# Patient Record
Sex: FEMALE | URBAN - METROPOLITAN AREA
[De-identification: names, ages, dates, MRNs, and addresses within clinical notes are randomized per-mention and may not be internally consistent; named-entity substitution may affect disease eponyms.]

---

## 2024-01-19 ENCOUNTER — HOSPITAL ENCOUNTER (OUTPATIENT)
Facility: MEDICAL CENTER | Age: 44
End: 2024-01-19
Attending: PEDIATRICS
Payer: COMMERCIAL

## 2024-01-19 PROCEDURE — 87070 CULTURE OTHR SPECIMN AEROBIC: CPT

## 2024-01-21 LAB
BACTERIA SPEC RESP CULT: NORMAL
SIGNIFICANT IND 70042: NORMAL
SITE SITE: NORMAL
SOURCE SOURCE: NORMAL

## 2024-05-08 ENCOUNTER — OFFICE VISIT (OUTPATIENT)
Dept: MEDICAL GROUP | Facility: MEDICAL CENTER | Age: 44
End: 2024-05-08
Payer: COMMERCIAL

## 2024-05-08 ENCOUNTER — RESEARCH ENCOUNTER (OUTPATIENT)
Dept: RESEARCH | Facility: MEDICAL CENTER | Age: 44
End: 2024-05-08

## 2024-05-08 VITALS
WEIGHT: 169.7 LBS | TEMPERATURE: 97.2 F | HEART RATE: 90 BPM | SYSTOLIC BLOOD PRESSURE: 100 MMHG | OXYGEN SATURATION: 95 % | DIASTOLIC BLOOD PRESSURE: 60 MMHG | HEIGHT: 69 IN | BODY MASS INDEX: 25.13 KG/M2

## 2024-05-08 DIAGNOSIS — Z11.4 SCREENING FOR HIV (HUMAN IMMUNODEFICIENCY VIRUS): ICD-10-CM

## 2024-05-08 DIAGNOSIS — F33.0 MILD EPISODE OF RECURRENT MAJOR DEPRESSIVE DISORDER (HCC): ICD-10-CM

## 2024-05-08 DIAGNOSIS — Z83.438 FAMILY HISTORY OF HYPERLIPIDEMIA: ICD-10-CM

## 2024-05-08 DIAGNOSIS — G43.109 MIGRAINE WITH AURA AND WITHOUT STATUS MIGRAINOSUS, NOT INTRACTABLE: ICD-10-CM

## 2024-05-08 DIAGNOSIS — F90.0 ATTENTION DEFICIT HYPERACTIVITY DISORDER (ADHD), PREDOMINANTLY INATTENTIVE TYPE: ICD-10-CM

## 2024-05-08 DIAGNOSIS — E78.2 MIXED HYPERLIPIDEMIA: Primary | ICD-10-CM

## 2024-05-08 DIAGNOSIS — Z11.59 NEED FOR HEPATITIS C SCREENING TEST: ICD-10-CM

## 2024-05-08 DIAGNOSIS — Z00.00 HEALTHCARE MAINTENANCE: ICD-10-CM

## 2024-05-08 DIAGNOSIS — E55.9 VITAMIN D DEFICIENCY: ICD-10-CM

## 2024-05-08 PROBLEM — Z30.431 CONTRACEPTIVE, SURVEILLANCE, INTRAUTERINE DEVICE: Status: RESOLVED | Noted: 2019-07-15 | Resolved: 2024-05-08

## 2024-05-08 PROCEDURE — 3078F DIAST BP <80 MM HG: CPT | Performed by: STUDENT IN AN ORGANIZED HEALTH CARE EDUCATION/TRAINING PROGRAM

## 2024-05-08 PROCEDURE — 99204 OFFICE O/P NEW MOD 45 MIN: CPT | Performed by: STUDENT IN AN ORGANIZED HEALTH CARE EDUCATION/TRAINING PROGRAM

## 2024-05-08 PROCEDURE — 3074F SYST BP LT 130 MM HG: CPT | Performed by: STUDENT IN AN ORGANIZED HEALTH CARE EDUCATION/TRAINING PROGRAM

## 2024-05-08 RX ORDER — DEXTROAMPHETAMINE SACCHARATE, AMPHETAMINE ASPARTATE, DEXTROAMPHETAMINE SULFATE AND AMPHETAMINE SULFATE 5; 5; 5; 5 MG/1; MG/1; MG/1; MG/1
TABLET ORAL
COMMUNITY
Start: 2024-05-01

## 2024-05-08 RX ORDER — SPIRONOLACTONE 100 MG/1
TABLET, FILM COATED ORAL
COMMUNITY
Start: 2024-05-06

## 2024-05-08 RX ORDER — FLUOXETINE 20 MG/1
TABLET, FILM COATED ORAL
COMMUNITY
Start: 2024-03-26 | End: 2024-05-08

## 2024-05-08 ASSESSMENT — ENCOUNTER SYMPTOMS
SHORTNESS OF BREATH: 0
CONSTIPATION: 0
FOCAL WEAKNESS: 0
SORE THROAT: 0
HEADACHES: 0
SPUTUM PRODUCTION: 0
SPEECH CHANGE: 0
SEIZURES: 0
SENSORY CHANGE: 0
FEVER: 0
COUGH: 0
PALPITATIONS: 0
ABDOMINAL PAIN: 0
MYALGIAS: 0
INSOMNIA: 0
HEARTBURN: 0
WHEEZING: 0

## 2024-05-08 ASSESSMENT — PATIENT HEALTH QUESTIONNAIRE - PHQ9
1. LITTLE INTEREST OR PLEASURE IN DOING THINGS: NOT AT ALL
9. THOUGHTS THAT YOU WOULD BE BETTER OFF DEAD, OR OF HURTING YOURSELF: NOT AT ALL
4. FEELING TIRED OR HAVING LITTLE ENERGY: NOT AT ALL
8. MOVING OR SPEAKING SO SLOWLY THAT OTHER PEOPLE COULD HAVE NOTICED. OR THE OPPOSITE, BEING SO FIGETY OR RESTLESS THAT YOU HAVE BEEN MOVING AROUND A LOT MORE THAN USUAL: NOT AT ALL
SUM OF ALL RESPONSES TO PHQ QUESTIONS 1-9: 0
2. FEELING DOWN, DEPRESSED, IRRITABLE, OR HOPELESS: NOT AT ALL
SUM OF ALL RESPONSES TO PHQ9 QUESTIONS 1 AND 2: 0
3. TROUBLE FALLING OR STAYING ASLEEP OR SLEEPING TOO MUCH: NOT AT ALL
5. POOR APPETITE OR OVEREATING: NOT AT ALL
6. FEELING BAD ABOUT YOURSELF - OR THAT YOU ARE A FAILURE OR HAVE LET YOURSELF OR YOUR FAMILY DOWN: NOT AL ALL
7. TROUBLE CONCENTRATING ON THINGS, SUCH AS READING THE NEWSPAPER OR WATCHING TELEVISION: NOT AT ALL

## 2024-05-08 NOTE — RESEARCH NOTE
Patient has been referred by Corazon Prasad M.D. Sent initial referral follow-up message with instructions to locate and sign consent form(s). The following consent form(s) have been pushed to the patient's MyChart: BOY

## 2024-05-08 NOTE — PROGRESS NOTES
Subjective:     CC:  Diagnoses of Migraine with aura and without status migrainosus, not intractable, Mixed hyperlipidemia, Healthcare maintenance, Vitamin D deficiency, Family history of hyperlipidemia, Attention deficit hyperactivity disorder (ADHD), predominantly inattentive type, and Mild episode of recurrent major depressive disorder (HCC) were pertinent to this visit.    HISTORY OF THE PRESENT ILLNESS: Patient is a 43 y.o. female. This pleasant patient is here today to establish care.. His/her prior PCP was Jennifer Ugarte.        Problem   Attention Deficit Hyperactivity Disorder (Adhd), Predominantly Inattentive Type    Chronic, stable   Following up with behavior health  Truman Alvarez Jr., FNP.     Current Mild Episode of Major Depressive Disorder (Hcc)    Chronic, stable \  History of severe post partum depression as well  Moods better, no Si and HI   Tried weaning off but not tolerated. Doing better with medication  Prozac 20 mg daily   Following up with behavior health        Mixed Hyperlipidemia    Chronic, stable   Lab Results   Component Value Date/Time    CHOLSTRLTOT 243 (H) 05/24/2019 0911    TRIGLYCERIDE 177 (H) 05/24/2019 0911    HDL 48 05/24/2019 0911     (H) 05/24/2019 0911      Migraine With Aura    Chronic, stable   Excessive migraine episodes with OCPS so stopped   Didn't tolerate IUD as well   Since she stopped OCPs , no more migraine episode            Assessment & Plan:   43 y.o. female with the following -    1. Migraine with aura and without status migrainosus, not intractable  Chronic , stable   Continue monitoring   Avoid triggers   Recommendations:  - get 7-9 hours of sleep per night  - drink plenty of fluids  - avoid excessive caffeine intake   - eat regular meals   - get moderate exercise       2. Mixed hyperlipidemia  Chronic, stable   The ASCVD Risk score : <1%  Plan  Recommended to continue low fat healthy diet and regular exercise     - Lipoprotein (a); Future    3.  " Family history of hyperlipidemia  Patient does have family history of hyperlipidemia with elevated LDL and total cholesterol.  But low ASCVD risk score  Will check lipoprotein a levels as well  - Lipoprotein (a); Future  Recommended genetic testing/healthy [place] research project    4. Attention deficit hyperactivity disorder (ADHD), predominantly inattentive type  Chronic, stable  Continue current medications Adderall 20 mg as prescribed by behavioral health  Treatment deferred to specialist group    5. Mild episode of recurrent major depressive disorder (HCC)  This is chronic, stable  Continue Prozac 20 mg daily  Continue follow-up with behavioral health    Health Maintenance: Completed    ROS:   Review of Systems   Constitutional:  Negative for fever and malaise/fatigue.   HENT:  Negative for congestion, ear discharge, ear pain and sore throat.    Respiratory:  Negative for cough, sputum production, shortness of breath and wheezing.    Cardiovascular:  Negative for chest pain, palpitations and leg swelling.   Gastrointestinal:  Negative for abdominal pain, constipation and heartburn.   Genitourinary:  Negative for dysuria and urgency.   Musculoskeletal:  Negative for myalgias.   Neurological:  Negative for sensory change, speech change, focal weakness, seizures and headaches.   Psychiatric/Behavioral:  The patient does not have insomnia.          Objective:       Exam: /60 (BP Location: Left arm, Patient Position: Sitting, BP Cuff Size: Adult)   Pulse 90   Temp 36.2 °C (97.2 °F) (Temporal)   Ht 1.753 m (5' 9\")   Wt 77 kg (169 lb 11.2 oz)   SpO2 95%  Body mass index is 25.06 kg/m².    Physical Exam  Constitutional:       Appearance: Normal appearance.   HENT:      Head: Normocephalic.   Eyes:      General: No scleral icterus.  Cardiovascular:      Rate and Rhythm: Normal rate and regular rhythm.      Pulses: Normal pulses.      Heart sounds: Normal heart sounds.   Pulmonary:      Effort: Pulmonary " effort is normal.      Breath sounds: Normal breath sounds.   Musculoskeletal:      Right lower leg: No edema.      Left lower leg: No edema.   Skin:     General: Skin is warm.   Neurological:      Mental Status: She is alert and oriented to person, place, and time.   Psychiatric:         Mood and Affect: Mood normal.         Behavior: Behavior normal.       Labs: reviewed       Referral for genetic research was offered -accepted    Please note that this dictation was created using voice recognition software. I have made every reasonable attempt to correct obvious errors, but I expect that there are errors of grammar and possibly content that I did not discover before finalizing the note.        No follow-ups on file.    Please note that this dictation was created using voice recognition software. I have made every reasonable attempt to correct obvious errors, but I expect that there are errors of grammar and possibly content that I did not discover before finalizing the note.

## 2024-08-30 ENCOUNTER — HOSPITAL ENCOUNTER (OUTPATIENT)
Dept: LAB | Facility: MEDICAL CENTER | Age: 44
End: 2024-08-30
Attending: STUDENT IN AN ORGANIZED HEALTH CARE EDUCATION/TRAINING PROGRAM
Payer: COMMERCIAL

## 2024-08-30 DIAGNOSIS — Z00.00 HEALTHCARE MAINTENANCE: ICD-10-CM

## 2024-08-30 DIAGNOSIS — Z11.59 NEED FOR HEPATITIS C SCREENING TEST: ICD-10-CM

## 2024-08-30 DIAGNOSIS — Z11.4 SCREENING FOR HIV (HUMAN IMMUNODEFICIENCY VIRUS): ICD-10-CM

## 2024-08-30 DIAGNOSIS — E55.9 VITAMIN D DEFICIENCY: ICD-10-CM

## 2024-08-30 DIAGNOSIS — Z83.438 FAMILY HISTORY OF HYPERLIPIDEMIA: ICD-10-CM

## 2024-08-30 DIAGNOSIS — E78.2 MIXED HYPERLIPIDEMIA: ICD-10-CM

## 2024-08-30 LAB
25(OH)D3 SERPL-MCNC: 25 NG/ML (ref 30–100)
ALBUMIN SERPL BCP-MCNC: 4.6 G/DL (ref 3.2–4.9)
ALBUMIN/GLOB SERPL: 1.6 G/DL
ALP SERPL-CCNC: 53 U/L (ref 30–99)
ALT SERPL-CCNC: 17 U/L (ref 2–50)
ANION GAP SERPL CALC-SCNC: 11 MMOL/L (ref 7–16)
AST SERPL-CCNC: 19 U/L (ref 12–45)
BILIRUB SERPL-MCNC: 0.3 MG/DL (ref 0.1–1.5)
BUN SERPL-MCNC: 14 MG/DL (ref 8–22)
CALCIUM ALBUM COR SERPL-MCNC: 9.2 MG/DL (ref 8.5–10.5)
CALCIUM SERPL-MCNC: 9.7 MG/DL (ref 8.5–10.5)
CHLORIDE SERPL-SCNC: 105 MMOL/L (ref 96–112)
CHOLEST SERPL-MCNC: 243 MG/DL (ref 100–199)
CO2 SERPL-SCNC: 22 MMOL/L (ref 20–33)
CREAT SERPL-MCNC: 0.76 MG/DL (ref 0.5–1.4)
ERYTHROCYTE [DISTWIDTH] IN BLOOD BY AUTOMATED COUNT: 44.5 FL (ref 35.9–50)
EST. AVERAGE GLUCOSE BLD GHB EST-MCNC: 103 MG/DL
GFR SERPLBLD CREATININE-BSD FMLA CKD-EPI: 98 ML/MIN/1.73 M 2
GLOBULIN SER CALC-MCNC: 2.8 G/DL (ref 1.9–3.5)
GLUCOSE SERPL-MCNC: 91 MG/DL (ref 65–99)
HBA1C MFR BLD: 5.2 % (ref 4–5.6)
HCT VFR BLD AUTO: 40 % (ref 37–47)
HCV AB SER QL: NORMAL
HDLC SERPL-MCNC: 57 MG/DL
HGB BLD-MCNC: 12.5 G/DL (ref 12–16)
HIV 1+2 AB+HIV1 P24 AG SERPL QL IA: NORMAL
LDLC SERPL CALC-MCNC: 165 MG/DL
MCH RBC QN AUTO: 28 PG (ref 27–33)
MCHC RBC AUTO-ENTMCNC: 31.3 G/DL (ref 32.2–35.5)
MCV RBC AUTO: 89.7 FL (ref 81.4–97.8)
PLATELET # BLD AUTO: 244 K/UL (ref 164–446)
PMV BLD AUTO: 11.1 FL (ref 9–12.9)
POTASSIUM SERPL-SCNC: 4 MMOL/L (ref 3.6–5.5)
PROT SERPL-MCNC: 7.4 G/DL (ref 6–8.2)
RBC # BLD AUTO: 4.46 M/UL (ref 4.2–5.4)
SODIUM SERPL-SCNC: 138 MMOL/L (ref 135–145)
TRIGL SERPL-MCNC: 105 MG/DL (ref 0–149)
TSH SERPL DL<=0.005 MIU/L-ACNC: 1.02 UIU/ML (ref 0.38–5.33)
WBC # BLD AUTO: 5.7 K/UL (ref 4.8–10.8)

## 2024-08-30 PROCEDURE — 85027 COMPLETE CBC AUTOMATED: CPT

## 2024-08-30 PROCEDURE — 83036 HEMOGLOBIN GLYCOSYLATED A1C: CPT

## 2024-08-30 PROCEDURE — 83695 ASSAY OF LIPOPROTEIN(A): CPT

## 2024-08-30 PROCEDURE — 87389 HIV-1 AG W/HIV-1&-2 AB AG IA: CPT

## 2024-08-30 PROCEDURE — 82306 VITAMIN D 25 HYDROXY: CPT

## 2024-08-30 PROCEDURE — 80061 LIPID PANEL: CPT

## 2024-08-30 PROCEDURE — 86803 HEPATITIS C AB TEST: CPT

## 2024-08-30 PROCEDURE — 84443 ASSAY THYROID STIM HORMONE: CPT

## 2024-08-30 PROCEDURE — 36415 COLL VENOUS BLD VENIPUNCTURE: CPT

## 2024-08-30 PROCEDURE — 80053 COMPREHEN METABOLIC PANEL: CPT

## 2024-09-02 LAB — LPA SERPL-MCNC: 117 MG/DL

## 2024-09-02 NOTE — PROGRESS NOTES
Subjective:     CC:   Chief Complaint   Patient presents with    Annual Exam       HPI:   Estrellita Baker is a 44 y.o. female who presents for annual exam      Labs reviewed  1.  Low vitamin D levels at 25  2. elevated cholesterol levels    Lab Results   Component Value Date/Time    CHOLSTRLTOT 243 (H) 08/30/2024 1152    TRIGLYCERIDE 105 08/30/2024 1152    HDL 57 08/30/2024 1152     (H) 08/30/2024 1152   Lipoprotein A is elevated 117 .   Family history of hyperlipidemia   The 10-year ASCVD risk score (Akilah SANCEHZ, et al., 2019) is: 0.7%      Last pap: 2019, due this year   Last mammo: 07/2023  Last colonoscopy: Due this year  Last Tdap: 2020       Menses every month with 4-5 days, sometimes heavy   Mild cramps , need tylenol or ibuprofen once a while   No significant bloating/fluid retention, pelvic pain, or dyspareunia. No vaginal discharge   No breast tenderness, mass, nipple discharge, changes in size or contour, or abnormal cyclic discomfort.  She does perform regular self breast exams.  Regular exercise: yes   Diet: healthy diet     She  has a past medical history of Contraceptive, surveillance, intrauterine device (07/15/2019) and Diabetes (HCC).  She  has a past surgical history that includes tonsillectomy and other.    Family History   Problem Relation Age of Onset    Autoimmune Disease Mother     Liver Cancer Mother     Hyperlipidemia Father     Hypertension Father     Melanoma Maternal Grandfather        Social History     Socioeconomic History    Marital status:      Spouse name: Not on file    Number of children: Not on file    Years of education: Not on file    Highest education level: Not on file   Occupational History    Not on file   Tobacco Use    Smoking status: Never    Smokeless tobacco: Never   Substance and Sexual Activity    Alcohol use: Yes     Comment: socially    Drug use: Never    Sexual activity: Yes     Partners: Male   Other Topics Concern    Not on file   Social  "History Narrative    Not on file     Social Determinants of Health     Financial Resource Strain: Not on file   Food Insecurity: Not on file   Transportation Needs: Not on file   Physical Activity: Not on file   Stress: Not on file   Social Connections: Not on file   Intimate Partner Violence: Not on file   Housing Stability: Not on file       Patient Active Problem List    Diagnosis Date Noted    Attention deficit hyperactivity disorder (ADHD), predominantly inattentive type 05/08/2024    Current mild episode of major depressive disorder (HCC) 09/10/2019    Need for vaccination 09/10/2019    Mixed hyperlipidemia 06/07/2019    Chronic fatigue 06/07/2019    RUQ pain 05/22/2019    Migraine with aura 05/22/2019         Current Outpatient Medications   Medication Sig Dispense Refill    amphetamine-dextroamphetamine (ADDERALL) 20 MG Tab       FLUoxetine (PROZAC) 20 MG Cap Take 1 Cap by mouth every day. 30 Cap 3     No current facility-administered medications for this visit.     No Known Allergies    Review of Systems    Review of Systems   Constitutional:  Negative for chills and fever.   HENT:  Negative for congestion and sore throat.    Cardiovascular:  Negative for chest pain and palpitations.   Gastrointestinal:  Negative for abdominal pain, blood in stool, melena and vomiting.   Musculoskeletal:  Negative for falls.   Skin:  Negative for rash.   Neurological:  Negative for dizziness, sensory change, focal weakness and seizures.   Psychiatric/Behavioral:  Negative for hallucinations and suicidal ideas.        Objective:     /60 (BP Location: Left arm, Patient Position: Sitting, BP Cuff Size: Adult)   Pulse 71   Temp 36.3 °C (97.3 °F) (Temporal)   Ht 1.753 m (5' 9\")   Wt 79.6 kg (175 lb 6.4 oz)   LMP 08/15/2024   SpO2 99%   BMI 25.90 kg/m²   Body mass index is 25.9 kg/m².  Wt Readings from Last 4 Encounters:   09/03/24 79.6 kg (175 lb 6.4 oz)   05/08/24 77 kg (169 lb 11.2 oz)   12/29/20 86.2 kg (190 lb) "   09/10/19 86.6 kg (191 lb)       Physical Exam:    Physical Exam  Constitutional:       Appearance: Normal appearance.   HENT:      Head: Normocephalic.      Mouth/Throat:      Mouth: Mucous membranes are moist.      Pharynx: Oropharynx is clear.   Eyes:      General: No scleral icterus.     Conjunctiva/sclera: Conjunctivae normal.   Cardiovascular:      Rate and Rhythm: Normal rate and regular rhythm.      Pulses: Normal pulses.      Heart sounds: Normal heart sounds.   Pulmonary:      Effort: Pulmonary effort is normal.      Breath sounds: Normal breath sounds.   Abdominal:      General: Bowel sounds are normal.      Palpations: Abdomen is soft.   Musculoskeletal:      Cervical back: Neck supple.      Right lower leg: No edema.      Left lower leg: No edema.   Skin:     General: Skin is warm.      Capillary Refill: Capillary refill takes less than 2 seconds.   Neurological:      General: No focal deficit present.      Mental Status: She is alert and oriented to person, place, and time.   Psychiatric:         Mood and Affect: Mood normal.         Behavior: Behavior normal.          Assessment and Plan:     1. Wellness examination        2. Mixed hyperlipidemia  Referral to Vascular Medicine      3. Vitamin D deficiency        4. Colon cancer screening  COLOGUARD (FIT DNA)      5. Elevated lipoprotein A level  Referral to Vascular Medicine      6. Encounter for screening mammogram for malignant neoplasm of breast  MA-SCREENING MAMMO BILAT W/TOMOSYNTHESIS W/CAD        1. Wellness examination    HCM:      regular exercise   healthy diet  Sun protection w SPF  Safety belts  Biannual dentis visit   Substance Abuse: Declined  Safe in relationship. Yes  Seat belts, bike helmet, gun safety discussed.  Colon cancer screening: due   Mammogram: due   Last lab results were reviewed by me today   Labs per orders    2. Mixed hyperlipidemia  3. Elevated lipoprotein A level    Chronic, stable   Lab Results   Component Value  Date/Time    CHOLSTRLTOT 243 (H) 08/30/2024 1152    TRIGLYCERIDE 105 08/30/2024 1152    HDL 57 08/30/2024 1152     (H) 08/30/2024 1152     Elevated lipoprotein A  The 10-year ASCVD risk score (Akilah SANCHEZ, et al., 2019) is: 0.7%  Plan:  Recommended to continue low fat healthy diet and regular exercise   - Referral to Vascular Medicine    3. Vitamin D deficiency  Recommended OTC vit D 2000 IU daily     4. Colon cancer screening  - COLOGUARD (FIT DNA)      5. Encounter for screening mammogram for malignant neoplasm of breast  - MA-SCREENING MAMMO BILAT W/TOMOSYNTHESIS W/CAD; Future        Follow-up: Return in about 4 months (around 1/3/2025) for pap.

## 2024-09-03 ENCOUNTER — OFFICE VISIT (OUTPATIENT)
Dept: MEDICAL GROUP | Facility: MEDICAL CENTER | Age: 44
End: 2024-09-03
Payer: COMMERCIAL

## 2024-09-03 VITALS
HEIGHT: 69 IN | WEIGHT: 175.4 LBS | SYSTOLIC BLOOD PRESSURE: 106 MMHG | HEART RATE: 71 BPM | BODY MASS INDEX: 25.98 KG/M2 | DIASTOLIC BLOOD PRESSURE: 60 MMHG | OXYGEN SATURATION: 99 % | TEMPERATURE: 97.3 F

## 2024-09-03 DIAGNOSIS — Z12.31 ENCOUNTER FOR SCREENING MAMMOGRAM FOR MALIGNANT NEOPLASM OF BREAST: ICD-10-CM

## 2024-09-03 DIAGNOSIS — E78.41 ELEVATED LIPOPROTEIN A LEVEL: ICD-10-CM

## 2024-09-03 DIAGNOSIS — Z12.11 COLON CANCER SCREENING: ICD-10-CM

## 2024-09-03 DIAGNOSIS — E55.9 VITAMIN D DEFICIENCY: ICD-10-CM

## 2024-09-03 DIAGNOSIS — E78.2 MIXED HYPERLIPIDEMIA: ICD-10-CM

## 2024-09-03 DIAGNOSIS — Z00.00 WELLNESS EXAMINATION: Primary | ICD-10-CM

## 2024-09-03 PROCEDURE — 3074F SYST BP LT 130 MM HG: CPT | Performed by: STUDENT IN AN ORGANIZED HEALTH CARE EDUCATION/TRAINING PROGRAM

## 2024-09-03 PROCEDURE — 99396 PREV VISIT EST AGE 40-64: CPT | Performed by: STUDENT IN AN ORGANIZED HEALTH CARE EDUCATION/TRAINING PROGRAM

## 2024-09-03 PROCEDURE — 3078F DIAST BP <80 MM HG: CPT | Performed by: STUDENT IN AN ORGANIZED HEALTH CARE EDUCATION/TRAINING PROGRAM

## 2024-09-03 PROCEDURE — 99213 OFFICE O/P EST LOW 20 MIN: CPT | Mod: 25 | Performed by: STUDENT IN AN ORGANIZED HEALTH CARE EDUCATION/TRAINING PROGRAM

## 2024-09-03 ASSESSMENT — FIBROSIS 4 INDEX: FIB4 SCORE: 0.85

## 2024-09-03 ASSESSMENT — ENCOUNTER SYMPTOMS
SENSORY CHANGE: 0
SORE THROAT: 0
FALLS: 0
FEVER: 0
DIZZINESS: 0
BLOOD IN STOOL: 0
SEIZURES: 0
FOCAL WEAKNESS: 0
ABDOMINAL PAIN: 0
VOMITING: 0
HALLUCINATIONS: 0
PALPITATIONS: 0
CHILLS: 0

## 2024-09-10 ENCOUNTER — TELEPHONE (OUTPATIENT)
Dept: HEALTH INFORMATION MANAGEMENT | Facility: OTHER | Age: 44
End: 2024-09-10
Payer: COMMERCIAL

## 2024-09-19 ENCOUNTER — HOSPITAL ENCOUNTER (OUTPATIENT)
Dept: RADIOLOGY | Facility: MEDICAL CENTER | Age: 44
End: 2024-09-19
Payer: COMMERCIAL

## 2024-09-25 ENCOUNTER — HOSPITAL ENCOUNTER (OUTPATIENT)
Dept: RADIOLOGY | Facility: MEDICAL CENTER | Age: 44
End: 2024-09-25
Attending: STUDENT IN AN ORGANIZED HEALTH CARE EDUCATION/TRAINING PROGRAM
Payer: COMMERCIAL

## 2024-09-25 DIAGNOSIS — Z12.31 ENCOUNTER FOR SCREENING MAMMOGRAM FOR MALIGNANT NEOPLASM OF BREAST: ICD-10-CM

## 2024-09-25 PROCEDURE — 77067 SCR MAMMO BI INCL CAD: CPT

## 2024-09-30 ENCOUNTER — TELEPHONE (OUTPATIENT)
Dept: VASCULAR LAB | Facility: MEDICAL CENTER | Age: 44
End: 2024-09-30
Payer: COMMERCIAL

## 2024-09-30 NOTE — TELEPHONE ENCOUNTER
Called to confirm if this will be first time patient is Vascular Med provider and review if any recent testing completed or hospital admissions. No answer, left voicemail to call back.    Correct appointment type? YES   Referral attached to appointment? YES   New patient packet sent via [employer]?  YES

## 2024-10-02 ENCOUNTER — HOSPITAL ENCOUNTER (OUTPATIENT)
Dept: RADIOLOGY | Facility: MEDICAL CENTER | Age: 44
End: 2024-10-02
Attending: STUDENT IN AN ORGANIZED HEALTH CARE EDUCATION/TRAINING PROGRAM
Payer: COMMERCIAL

## 2024-10-02 DIAGNOSIS — R92.8 ABNORMAL MAMMOGRAM: ICD-10-CM

## 2024-10-02 PROCEDURE — 76642 ULTRASOUND BREAST LIMITED: CPT | Mod: LT

## 2024-10-04 ENCOUNTER — OFFICE VISIT (OUTPATIENT)
Dept: VASCULAR LAB | Facility: MEDICAL CENTER | Age: 44
End: 2024-10-04
Attending: FAMILY MEDICINE
Payer: COMMERCIAL

## 2024-10-04 VITALS
SYSTOLIC BLOOD PRESSURE: 110 MMHG | BODY MASS INDEX: 25.48 KG/M2 | HEIGHT: 69 IN | HEART RATE: 76 BPM | WEIGHT: 172 LBS | DIASTOLIC BLOOD PRESSURE: 80 MMHG

## 2024-10-04 DIAGNOSIS — E78.00 PRIMARY HYPERCHOLESTEROLEMIA: Chronic | ICD-10-CM

## 2024-10-04 DIAGNOSIS — E78.41 ELEVATED LIPOPROTEIN(A): Chronic | ICD-10-CM

## 2024-10-04 DIAGNOSIS — E55.9 VITAMIN D DEFICIENCY: ICD-10-CM

## 2024-10-04 PROBLEM — N92.6 ABNORMAL MENSES: Status: ACTIVE | Noted: 2024-10-04

## 2024-10-04 PROBLEM — Z86.2 HX OF IRON DEFICIENCY ANEMIA: Status: ACTIVE | Noted: 2024-10-04

## 2024-10-04 PROBLEM — F41.9 ANXIETY: Status: ACTIVE | Noted: 2024-10-04

## 2024-10-04 PROCEDURE — 3079F DIAST BP 80-89 MM HG: CPT | Performed by: FAMILY MEDICINE

## 2024-10-04 PROCEDURE — 99212 OFFICE O/P EST SF 10 MIN: CPT

## 2024-10-04 PROCEDURE — 99204 OFFICE O/P NEW MOD 45 MIN: CPT | Performed by: FAMILY MEDICINE

## 2024-10-04 PROCEDURE — 3074F SYST BP LT 130 MM HG: CPT | Performed by: FAMILY MEDICINE

## 2024-10-04 RX ORDER — ERGOCALCIFEROL 1.25 MG/1
CAPSULE, LIQUID FILLED ORAL
COMMUNITY

## 2024-10-04 RX ORDER — ROSUVASTATIN CALCIUM 10 MG/1
10 TABLET, COATED ORAL DAILY
Qty: 100 TABLET | Refills: 3 | Status: SHIPPED | OUTPATIENT
Start: 2024-10-04 | End: 2024-10-14

## 2024-10-04 ASSESSMENT — ENCOUNTER SYMPTOMS
PND: 0
SHORTNESS OF BREATH: 0
PALPITATIONS: 0
CLAUDICATION: 0
CHILLS: 0
SPUTUM PRODUCTION: 0
HEMOPTYSIS: 0
WHEEZING: 0
ORTHOPNEA: 0
FEVER: 0
COUGH: 0

## 2024-10-04 ASSESSMENT — FIBROSIS 4 INDEX: FIB4 SCORE: 0.85

## 2024-10-14 DIAGNOSIS — E78.00 PRIMARY HYPERCHOLESTEROLEMIA: ICD-10-CM

## 2024-10-14 RX ORDER — ATORVASTATIN CALCIUM 10 MG/1
10 TABLET, FILM COATED ORAL NIGHTLY
Qty: 30 TABLET | Refills: 11 | Status: SHIPPED | OUTPATIENT
Start: 2024-10-14

## 2024-11-22 ENCOUNTER — OFFICE VISIT (OUTPATIENT)
Dept: MEDICAL GROUP | Facility: MEDICAL CENTER | Age: 44
End: 2024-11-22
Payer: COMMERCIAL

## 2024-11-22 ENCOUNTER — HOSPITAL ENCOUNTER (OUTPATIENT)
Facility: MEDICAL CENTER | Age: 44
End: 2024-11-22
Attending: STUDENT IN AN ORGANIZED HEALTH CARE EDUCATION/TRAINING PROGRAM
Payer: COMMERCIAL

## 2024-11-22 VITALS
DIASTOLIC BLOOD PRESSURE: 60 MMHG | SYSTOLIC BLOOD PRESSURE: 120 MMHG | WEIGHT: 171.4 LBS | HEART RATE: 64 BPM | OXYGEN SATURATION: 99 % | BODY MASS INDEX: 25.39 KG/M2 | TEMPERATURE: 98 F | HEIGHT: 69 IN

## 2024-11-22 DIAGNOSIS — N89.8 FOUL SMELLING VAGINAL DISCHARGE: ICD-10-CM

## 2024-11-22 DIAGNOSIS — E72.52: ICD-10-CM

## 2024-11-22 DIAGNOSIS — B96.89 BACTERIAL VAGINOSIS: ICD-10-CM

## 2024-11-22 DIAGNOSIS — M54.50 LOW BACK PAIN WITHOUT SCIATICA, UNSPECIFIED BACK PAIN LATERALITY, UNSPECIFIED CHRONICITY: ICD-10-CM

## 2024-11-22 DIAGNOSIS — N76.0 BACTERIAL VAGINOSIS: ICD-10-CM

## 2024-11-22 PROCEDURE — 87510 GARDNER VAG DNA DIR PROBE: CPT

## 2024-11-22 PROCEDURE — 3074F SYST BP LT 130 MM HG: CPT | Performed by: STUDENT IN AN ORGANIZED HEALTH CARE EDUCATION/TRAINING PROGRAM

## 2024-11-22 PROCEDURE — 99214 OFFICE O/P EST MOD 30 MIN: CPT | Performed by: STUDENT IN AN ORGANIZED HEALTH CARE EDUCATION/TRAINING PROGRAM

## 2024-11-22 PROCEDURE — 3078F DIAST BP <80 MM HG: CPT | Performed by: STUDENT IN AN ORGANIZED HEALTH CARE EDUCATION/TRAINING PROGRAM

## 2024-11-22 PROCEDURE — 87660 TRICHOMONAS VAGIN DIR PROBE: CPT

## 2024-11-22 PROCEDURE — 87480 CANDIDA DNA DIR PROBE: CPT

## 2024-11-22 ASSESSMENT — FIBROSIS 4 INDEX: FIB4 SCORE: 0.85

## 2024-11-22 NOTE — PROGRESS NOTES
"Subjective:     CC:   Chief Complaint   Patient presents with    Other     Pain in pelvic area        HPI:   Estrellita presents today with  Verbal consent was acquired by the patient to use [employer] ambient listening note generation during this visit Yes   History of Present Illness  The patient presents for evaluation of vaginal odor.    Vaginal Odor  She was previously prescribed rosuvastatin by her vascular medicine specialist for cholesterol management. However, she experienced an unpleasant vaginal odor as a side effect. After discussing this with her provider, her medication was switched to atorvastatin. Initially, the symptoms subsided, but they have since returned.     - Onset: Symptoms reappeared a week ago after refilling atorvastatin prescription 30 days ago.  - Character: Unpleasant vaginal odor.  - Timing: Initially subsided after switching to atorvastatin, but have since returned.  -Reports persistent foul fishy odor with mild low back discomfort  She is uncertain if these symptoms are related to her cholesterol medication. She experienced back pain yesterday, but it has since resolved.    She reports no symptoms of urinary tract infection.  Denies dysuria, lower abdominal pain, flank tenderness or pain    ALLERGIES  She is allergic to CRESTOR.       Statin?   could be due to the medication potentially exacerbating a condition called \"trimethylaminuria\" (also known as \"fish odor syndrome\") where the body produces excessive amounts of trimethylamine, a compound with a fishy smell that can be excreted through vaginal secretions, sweat, breath, and urine;     Health Maintenance: Completed    ROS:  ROS    Review of systems unremarkable except for concerns noted by patient or items listed.    Please see HPI for additional ROS.      Objective:     Exam:  /60 (BP Location: Left arm, Patient Position: Sitting, BP Cuff Size: Adult)   Pulse 64   Temp 36.7 °C (98 °F) (Temporal)   Ht 1.753 m (5' 9\")   Wt " 77.7 kg (171 lb 6.4 oz)   LMP 11/03/2024   SpO2 99%   BMI 25.31 kg/m²  Body mass index is 25.31 kg/m².    Physical Exam  Abdominal:      Tenderness: There is no right CVA tenderness or left CVA tenderness.             Labs: Reviewed    Assessment & Plan:     44 y.o. female with the following -     1. Foul smelling vaginal discharge  2.  Bacterial vaginosis  This is a new problem.  Will check for vaginal pathogens to rule out BV.  If BV positive will treat with metronidazole 500 mg twice daily for 7 days  - VAGINAL PATHOGENS DNA PANEL; Future    3. Fish odor syndrome (HCC)  Patient reports having fishy odor foul smell and vaginal discharge since she started using statin therapy initially with rosuvastatin and return of symptoms with atorvastatin.    Rosuvastatin can cause for short indrawn but usually atorvastatin less likely.  Will first rule out  vaginal BV infection if negative then recommended to discuss with vascular medicine about other options      Follow-up as needed    Please note that this dictation was created using voice recognition software. I have made every reasonable attempt to correct obvious errors, but I expect that there are errors of grammar and possibly content that I did not discover before finalizing the note.

## 2024-11-23 LAB
CANDIDA DNA VAG QL PROBE+SIG AMP: NEGATIVE
G VAGINALIS DNA VAG QL PROBE+SIG AMP: POSITIVE
T VAGINALIS DNA VAG QL PROBE+SIG AMP: NEGATIVE

## 2024-11-24 RX ORDER — METRONIDAZOLE 500 MG/1
500 TABLET ORAL 2 TIMES DAILY
Qty: 14 TABLET | Refills: 0 | Status: SHIPPED | OUTPATIENT
Start: 2024-11-24 | End: 2024-12-01

## 2024-12-03 ENCOUNTER — HOSPITAL ENCOUNTER (OUTPATIENT)
Dept: LAB | Facility: MEDICAL CENTER | Age: 44
End: 2024-12-03
Attending: FAMILY MEDICINE
Payer: COMMERCIAL

## 2024-12-03 DIAGNOSIS — E78.00 PRIMARY HYPERCHOLESTEROLEMIA: Chronic | ICD-10-CM

## 2024-12-03 PROCEDURE — 36415 COLL VENOUS BLD VENIPUNCTURE: CPT

## 2024-12-03 PROCEDURE — 80053 COMPREHEN METABOLIC PANEL: CPT

## 2024-12-03 PROCEDURE — 82172 ASSAY OF APOLIPOPROTEIN: CPT

## 2024-12-03 PROCEDURE — 80061 LIPID PANEL: CPT

## 2024-12-03 PROCEDURE — 86141 C-REACTIVE PROTEIN HS: CPT

## 2024-12-04 LAB
ALBUMIN SERPL BCP-MCNC: 4.3 G/DL (ref 3.2–4.9)
ALBUMIN/GLOB SERPL: 1.7 G/DL
ALP SERPL-CCNC: 54 U/L (ref 30–99)
ALT SERPL-CCNC: 11 U/L (ref 2–50)
ANION GAP SERPL CALC-SCNC: 14 MMOL/L (ref 7–16)
AST SERPL-CCNC: 19 U/L (ref 12–45)
BILIRUB SERPL-MCNC: 0.3 MG/DL (ref 0.1–1.5)
BUN SERPL-MCNC: 14 MG/DL (ref 8–22)
CALCIUM ALBUM COR SERPL-MCNC: 8.9 MG/DL (ref 8.5–10.5)
CALCIUM SERPL-MCNC: 9.1 MG/DL (ref 8.5–10.5)
CHLORIDE SERPL-SCNC: 105 MMOL/L (ref 96–112)
CHOLEST SERPL-MCNC: 162 MG/DL (ref 100–199)
CO2 SERPL-SCNC: 22 MMOL/L (ref 20–33)
CREAT SERPL-MCNC: 0.71 MG/DL (ref 0.5–1.4)
CRP SERPL HS-MCNC: 0.9 MG/L (ref 0–3)
GFR SERPLBLD CREATININE-BSD FMLA CKD-EPI: 106 ML/MIN/1.73 M 2
GLOBULIN SER CALC-MCNC: 2.5 G/DL (ref 1.9–3.5)
GLUCOSE SERPL-MCNC: 91 MG/DL (ref 65–99)
HDLC SERPL-MCNC: 60 MG/DL
LDLC SERPL CALC-MCNC: 90 MG/DL
POTASSIUM SERPL-SCNC: 4.4 MMOL/L (ref 3.6–5.5)
PROT SERPL-MCNC: 6.8 G/DL (ref 6–8.2)
SODIUM SERPL-SCNC: 141 MMOL/L (ref 135–145)
TRIGL SERPL-MCNC: 62 MG/DL (ref 0–149)

## 2024-12-06 LAB — APO B100 SERPL-MCNC: 69 MG/DL (ref 60–117)

## 2024-12-10 ENCOUNTER — OFFICE VISIT (OUTPATIENT)
Dept: VASCULAR LAB | Facility: MEDICAL CENTER | Age: 44
End: 2024-12-10
Attending: FAMILY MEDICINE
Payer: COMMERCIAL

## 2024-12-10 VITALS
HEIGHT: 69 IN | SYSTOLIC BLOOD PRESSURE: 113 MMHG | DIASTOLIC BLOOD PRESSURE: 80 MMHG | WEIGHT: 175 LBS | BODY MASS INDEX: 25.92 KG/M2 | HEART RATE: 72 BPM

## 2024-12-10 DIAGNOSIS — E78.41 ELEVATED LIPOPROTEIN(A): ICD-10-CM

## 2024-12-10 DIAGNOSIS — E55.9 VITAMIN D DEFICIENCY: ICD-10-CM

## 2024-12-10 DIAGNOSIS — E78.00 PRIMARY HYPERCHOLESTEROLEMIA: ICD-10-CM

## 2024-12-10 PROCEDURE — 3079F DIAST BP 80-89 MM HG: CPT | Performed by: FAMILY MEDICINE

## 2024-12-10 PROCEDURE — 3074F SYST BP LT 130 MM HG: CPT | Performed by: FAMILY MEDICINE

## 2024-12-10 PROCEDURE — 99212 OFFICE O/P EST SF 10 MIN: CPT

## 2024-12-10 PROCEDURE — 99213 OFFICE O/P EST LOW 20 MIN: CPT | Performed by: FAMILY MEDICINE

## 2024-12-10 ASSESSMENT — ENCOUNTER SYMPTOMS
FEVER: 0
SPUTUM PRODUCTION: 0
PND: 0
ORTHOPNEA: 0
PALPITATIONS: 0
COUGH: 0
WHEEZING: 0
CHILLS: 0
CLAUDICATION: 0
HEMOPTYSIS: 0
SHORTNESS OF BREATH: 0

## 2024-12-10 ASSESSMENT — FIBROSIS 4 INDEX: FIB4 SCORE: 1.06

## 2024-12-10 NOTE — PROGRESS NOTES
FOLLOW-UP FAMILY LIPID CLINIC  12/10/24     Estrellita Baker has been referred for evaluation and mgmt of dyslipidemia, Est 10/2024   Referral Source: Corzaon Prasad M.D.       Subjective      Interval hx/concerns: last seen 10/2024, started atorva and tolerating.  Had labs    Had failed rosuva due to vaginitis.     CURRENT MED MGMT  Current Rx's:   Statin: atorva 10mg   Non-Statin: None  Supplements: None  Current adverse drug reactions/side effects? N/a  Adherence? N\A    LIFESTYLE MGMT  Change in weight: Stable  Exercise habits: minimal exercise  Dietary patterns: increased salad, veg - reduced red meat, chicken, turkey.   Etoh: see sochx  Barriers to care/SDOH: none  Tobacco:  reports that she has never smoked. She has never used smokeless tobacco.     PERTINENT HLD PMHX  Initial visit history: seen by PCP 9/3/24 for AWV, had labs showing high , lp(a) 117 mg/dl.  Advised to continue healthy diet and exercise and ref to our clinic for further review. Pt reports no discussion of high lp(a) completed to date.   Age at Initial Diagnosis of Dyslipidemia: 40s    Baseline Lipids Prior to Treatment:    Latest Reference Range & Units 08/30/24 11:52   Cholesterol,Tot 100 - 199 mg/dL 243 (H)   Triglycerides 0 - 149 mg/dL 105   HDL >=40 mg/dL 57   LDL <100 mg/dL 165 (H)   Lipoprotein (a) <=29 mg/dL 117 (H)     History of ASCVD: None  Other Established (non-atherosclerotic) Vascular Disease: None  Secondary contributors/causes of dyslipidemia:  NONE   Previously Attempted Interventions for Lipids - including outcome  Statin: rosuvastatin   Outcome: vaginitis  Non-Statin: none   Outcome: not applicable    OTHER CV RISK FACTORS:    HTN: no prior dx or meds    Dysglycemia: no  Antithrombotic tx: no     Current Outpatient Medications on File Prior to Visit   Medication Sig Dispense Refill    atorvastatin (LIPITOR) 10 MG Tab Take 1 Tablet by mouth every evening. 30 Tablet 11    vitamin D2, Ergocalciferol, (DRISDOL)  "1.25 MG (82695 UT) Cap capsule Take  by mouth every 7 days.      multivitamin Tab Take 1 Tablet by mouth every day.      amphetamine-dextroamphetamine (ADDERALL) 20 MG Tab       FLUoxetine (PROZAC) 20 MG Cap Take 1 Cap by mouth every day. 30 Cap 3     No current facility-administered medications on file prior to visit.      Allergies   Allergen Reactions    Rosuvastatin Unspecified     Vaginal discomfort , fish odor       Social History     Tobacco Use    Smoking status: Never    Smokeless tobacco: Never   Substance Use Topics    Alcohol use: Yes     Comment: socially    Drug use: Never     Review of Systems   Constitutional:  Negative for chills, fever and malaise/fatigue.   Respiratory:  Negative for cough, hemoptysis, sputum production, shortness of breath and wheezing.    Cardiovascular:  Negative for chest pain, palpitations, orthopnea, claudication, leg swelling and PND.         Objective    Vitals:    12/10/24 1119 12/10/24 1122   BP: 120/81 113/80   BP Location: Left arm Left arm   Patient Position: Sitting Sitting   BP Cuff Size: Large adult Large adult   Pulse: 72 72   Weight: 79.4 kg (175 lb)    Height: 1.753 m (5' 9\")      BP Readings from Last 5 Encounters:   12/10/24 113/80   11/22/24 120/60   10/04/24 110/80   09/03/24 106/60   05/08/24 100/60     BMI Readings from Last 1 Encounters:   12/10/24 25.84 kg/m²     Wt Readings from Last 3 Encounters:   12/10/24 79.4 kg (175 lb)   11/22/24 77.7 kg (171 lb 6.4 oz)   10/04/24 78 kg (172 lb)     Physical Exam  Constitutional:       General: She is not in acute distress.     Appearance: Normal appearance. She is not ill-appearing.   HENT:      Head: Normocephalic and atraumatic.   Eyes:      Conjunctiva/sclera: Conjunctivae normal.      Comments: No corneal arcus   Neck:      Vascular: No carotid bruit.   Cardiovascular:      Rate and Rhythm: Normal rate and regular rhythm.      Pulses:           Carotid pulses are 2+ on the right side and 2+ on the left " side.       Radial pulses are 2+ on the right side and 2+ on the left side.        Popliteal pulses are 2+ on the right side and 2+ on the left side.        Dorsalis pedis pulses are 2+ on the right side and 2+ on the left side.      Heart sounds: No murmur heard.  Pulmonary:      Effort: Pulmonary effort is normal.      Breath sounds: Normal breath sounds.   Musculoskeletal:      Cervical back: Normal range of motion.      Right lower leg: No edema.      Left lower leg: No edema.   Feet:      Comments: No achilles thickening or nodularity  Skin:     General: Skin is warm and dry.      Coloration: Skin is not cyanotic or mottled.      Findings: No wound.      Comments: No tendon xanthomas   Neurological:      General: No focal deficit present.      Mental Status: She is alert.   Psychiatric:         Mood and Affect: Mood normal.       DATA REVIEW:  Most Recent Lipid Panel:   Lab Results   Component Value Date/Time    CHOLSTRLTOT 162 12/03/2024 07:24 AM    LDL 90 12/03/2024 07:24 AM    HDL 60 12/03/2024 07:24 AM    TRIGLYCERIDE 62 12/03/2024 07:24 AM     Lab Results   Component Value Date/Time    LDL 90 12/03/2024 07:24 AM     (H) 08/30/2024 11:52 AM     (H) 05/24/2019 09:11 AM      Lab Results   Component Value Date/Time    LIPOPROTA 117 (H) 08/30/2024 11:52 AM      Lab Results   Component Value Date/Time    APOB 69 12/03/2024 07:24 AM      Lab Results   Component Value Date/Time    CRPHIGHSEN 0.9 12/03/2024 07:24 AM       Other Pertinent Blood Work:   Lab Results   Component Value Date    SODIUM 141 12/03/2024    POTASSIUM 4.4 12/03/2024    CHLORIDE 105 12/03/2024    CO2 22 12/03/2024    ANION 14.0 12/03/2024    GLUCOSE 91 12/03/2024    BUN 14 12/03/2024    CREATININE 0.71 12/03/2024    CALCIUM 9.1 12/03/2024    ASTSGOT 19 12/03/2024    ALTSGPT 11 12/03/2024    ALKPHOSPHAT 54 12/03/2024    TBILIRUBIN 0.3 12/03/2024    ALBUMIN 4.3 12/03/2024    AGRATIO 1.7 12/03/2024    TSHULTRASEN 1.020 08/30/2024  "      IMAGING: none       PROCEDURES: none          ASSESSMENT AND PLAN  1. Primary hypercholesterolemia  APOLIPOPROTEIN B    Comp Metabolic Panel    Lipid Profile      2. Elevated lipoprotein(a)        3. Vitamin D deficiency          Patient Type: Primary Prevention    Major ASCVD events: None      Other established Vascular Disease: None    Evidence of genetic dyslipidemia: Yes     Elevated lp(a) = 117mg/dl, >3xULN, primary LP risk , higher risk for AV stenosis development. Has prothrombotic properties.  No murmur on exam  Plan:  - provided review and educ handouts from [email or web address]   - generally aggressive LDL-C lowering needed to reduce lp(a) driven risk   - see med mgmt below   - recommend for family screening of all 1st degree relatives   - has influence on treatment decisions relating to initiate or intensity of lipid lowering as well as aspirin therapy    - consider echo if murmur develops over time     FH genotyping: NO    ApoE genotyping: NO    Secondary causes/contributors: none identified     ACC/AHA Indication for Statin Therapy:  Primary Prevention - 40-76yo, LDLc >70, <190 w/o DM    ASCVD risk calculations  The 10-year ASCVD risk score (Akilah SANCHEZ, et al., 2019) is: 0.4%, <5% \"low risk\"    Lp(a) clinical guidance calculator:   Lifetime CVD risk w/o lp(a) = 9.0%  Lifetime CVD risk w/ lp(a) = 17.4%   With 65 mg/dl LDL-C reduction will return risk to 9.0%     Other Significant Risk Markers:  High-risk conditions: N/A  Risk-enhancers: Persistently elevated LDL-C >159 and Lp(a) >50mg/dL (>125 nmol/L)  Lipoprotein(a): Very High (>100 mg/dl or >225 nmol/L    Goal LDL-C and ApolipoproteinB/non-HDL-C:  LDL-C <100 mg/dl  apoB <90 mg/dl  At goals? No  - needs 40% LDL-C reduction to achieve goal     LIFESTYLE INTERVENTIONS  TOBACCO:  reports that she has never smoked. She has never used smokeless tobacco.   - continued complete avoidance of all tobacco products   PHYSICAL ACTIVITY: at least 150 min per " week of moderate intensity  NUTRITION: Mediterranean, reduce Na to 1500mg/day, and - handout provided   ETOH: limit to 1 or less standard drinks/day  WT MGMT: maintain current healthy weight    LIPID-LOWERING MEDICATION MANAGEMENT:     Statin Therapy:   - continue atorva 10mg daily - reviewed use, goals, ADRs   Non-Statin Meds: none    PCSK9 Inhibitor strategy indications: Not currently indicated    Recommended supplements: - continue vit D3 to replete levels and reduce myalgia risks     APHERESIS: n/a     BLOOD PRESSURE MANAGEMENT  Office BP Goal ACC/AHA (2017) goal <130/80  Home BP at goal:  yes  Office BP at goal:  yes  24h ABPM:  not indicated  RDN candidate? N/a  Contributing factors: n/a   Plan:   - monitor annual office-based BP and/or intermittent at home BP, report >130/80    - continue healthy lifestyle  Medications: none      GLYCEMIC STATUS: Normal    ANTITHROMBOTIC THERAPY Not currently recommended, may consider if future screening shows subclinical atherosclerosis     OTHER:      # vit D def - initiate vit D3 4000 units daily, repeat lab 6-12mo     Studies consider CACS in future    Labs: as noted above  Follow-Up:  6 months      Rupert Potts M.D.  FELIPA, board-certified Clinical Lipidologist   [facility]   [facility]   [phone]      CC:  CRISS Romo Pooja, M.D.

## 2025-01-14 ENCOUNTER — HOSPITAL ENCOUNTER (OUTPATIENT)
Facility: MEDICAL CENTER | Age: 45
End: 2025-01-14
Attending: STUDENT IN AN ORGANIZED HEALTH CARE EDUCATION/TRAINING PROGRAM
Payer: COMMERCIAL

## 2025-01-14 ENCOUNTER — OFFICE VISIT (OUTPATIENT)
Dept: MEDICAL GROUP | Facility: MEDICAL CENTER | Age: 45
End: 2025-01-14
Payer: COMMERCIAL

## 2025-01-14 VITALS
SYSTOLIC BLOOD PRESSURE: 106 MMHG | BODY MASS INDEX: 26.14 KG/M2 | DIASTOLIC BLOOD PRESSURE: 80 MMHG | OXYGEN SATURATION: 97 % | HEART RATE: 77 BPM | WEIGHT: 176.5 LBS | HEIGHT: 69 IN | TEMPERATURE: 97.6 F

## 2025-01-14 DIAGNOSIS — Z01.419 WELL WOMAN EXAM: ICD-10-CM

## 2025-01-14 DIAGNOSIS — Z12.4 SCREENING FOR CERVICAL CANCER: ICD-10-CM

## 2025-01-14 PROCEDURE — 3074F SYST BP LT 130 MM HG: CPT | Performed by: STUDENT IN AN ORGANIZED HEALTH CARE EDUCATION/TRAINING PROGRAM

## 2025-01-14 PROCEDURE — 99396 PREV VISIT EST AGE 40-64: CPT | Performed by: STUDENT IN AN ORGANIZED HEALTH CARE EDUCATION/TRAINING PROGRAM

## 2025-01-14 PROCEDURE — 87625 HPV TYPES 16 & 18 ONLY: CPT

## 2025-01-14 PROCEDURE — 87624 HPV HI-RISK TYP POOLED RSLT: CPT

## 2025-01-14 PROCEDURE — 88142 CYTOPATH C/V THIN LAYER: CPT

## 2025-01-14 PROCEDURE — 3079F DIAST BP 80-89 MM HG: CPT | Performed by: STUDENT IN AN ORGANIZED HEALTH CARE EDUCATION/TRAINING PROGRAM

## 2025-01-14 ASSESSMENT — FIBROSIS 4 INDEX: FIB4 SCORE: 1.06

## 2025-01-14 ASSESSMENT — PATIENT HEALTH QUESTIONNAIRE - PHQ9: CLINICAL INTERPRETATION OF PHQ2 SCORE: 0

## 2025-01-14 NOTE — PROGRESS NOTES
SUBJECTIVE: 44 y.o. female for annual routine gynecologic exam  Chief Complaint   Patient presents with    Annual Exam     Verbal consent was acquired by the patient to use [employer] ambient listening note generation during this visit Yes   History of Present Illness  The patient presents for a Pap smear.    She reports no acute health concerns at this time. Her last menstrual cycle was 25. She is not experiencing any symptoms such as itching, pain, or discomfort.         OB History    Para Term  AB Living   4 0 0 0 0 4   SAB IAB Ectopic Molar Multiple Live Births   0 0 0 0 0 4      Last Pap: 2019  Social History     Substance and Sexual Activity   Sexual Activity Yes    Partners: Male     H/O Abnormal Pap no  She  reports that she has never smoked. She has never used smokeless tobacco.        Allergies: Rosuvastatin     ROS:    Menses every month with moderate bleeding.  No significant bloating/fluid retention, pelvic pain, or dyspareunia. No vaginal discharge   No breast tenderness, mass, nipple discharge, changes in size or contour, or abnormal cyclic discomfort.  No urinary tract symptoms, no incontinence.   No abdominal pain, change in bowel habits, black or bloody stools.    No unusual headaches, no visual changes, menstrual migraines   No prolonged cough. No dyspnea or chest pain on exertion.  No depression, labile mood, anxiety ,libido changes, insomnia.  No new/concerning skin lesions, concerns.     Exercise: moderate regular exercise program  Preventive Care:  Health Maintenance Topics with due status: Overdue       Topic Date Due    Cervical Cancer Screening 2022    COVID-19 Vaccine 2024       Current medicines (including changes today)  Current Outpatient Medications   Medication Sig Dispense Refill    atorvastatin (LIPITOR) 10 MG Tab Take 1 Tablet by mouth every evening. 30 Tablet 11    vitamin D2, Ergocalciferol, (DRISDOL) 1.25 MG (52428 UT) Cap capsule Take  by mouth  "every 7 days.      multivitamin Tab Take 1 Tablet by mouth every day.      amphetamine-dextroamphetamine (ADDERALL) 20 MG Tab       FLUoxetine (PROZAC) 20 MG Cap Take 1 Cap by mouth every day. 30 Cap 3     No current facility-administered medications for this visit.     She  has a past medical history of Contraceptive, surveillance, intrauterine device (07/15/2019) and Diabetes (HCC).  She  has a past surgical history that includes tonsillectomy and other.     Family History:   Family History   Problem Relation Age of Onset    Autoimmune Disease Mother     Liver Cancer Mother     Hyperlipidemia Mother         statin    Hyperlipidemia Father         no meds    Hypertension Father     No Known Problems Sister     Hyperlipidemia Brother     Hyperlipidemia Brother     Melanoma Maternal Grandfather           OBJECTIVE:   /80 (BP Location: Left arm, Patient Position: Sitting, BP Cuff Size: Adult)   Pulse 77   Temp 36.4 °C (97.6 °F) (Temporal)   Ht 1.753 m (5' 9\")   Wt 80.1 kg (176 lb 8 oz)   LMP 01/03/2025   SpO2 97%   BMI 26.06 kg/m²   Body mass index is 26.06 kg/m².    HEAD AND NECK:  Ears normal.  Throat, oral cavity and tongue normal.  Neck supple. No adenopathy or masses in the neck or supraclavicular regions.  No carotid bruits. No thyromegaly. NEURO: Cranial nerves are normal. DTR's normal and symmetric.  CHEST:  Clear, good air entry, no wheezes or rales. HEART:  Regular rate and rhythm.  S1 and S2 normal.ABDOMEN:  Soft without tenderness, guarding, mass or organomegaly.  No CVA tenderness or inguinal adenopathy. EXTREMITIES:  Extremities, reflexes and peripheral pulses are normal. SKIN: color normal, vascularity normal, no edema, temperature normal   No rashes or suspicious skin lesions noted.     Breast Exam: Performed with instruction during examination. No axillary lymphadenopathy, no skin changes, no dominant masses. No nipple retraction    Pelvic Exam -  Normal external genitalia with no " lesions. Normal vaginal mucosa with normal rugation and white thick discharge. Cervix with no visible lesions. No cervical motion tenderness. Uterus is normal sized with no masses. No adnexal tenderness or enlargement appreciated. Sure Path Pap is obtained, vaginal swab is obtained and specimen(s) sent to lab  Rectal: deferred    <ASSESSMENT and PLAN>    1. Well woman exam  2. Screening for cervical cancer  - THINPREP PAP WITH HPV; Future    Discussed  breast self exam, mammography screening, feminine hygiene, adequate intake of calcium and vitamin D, diet and exercise, colorectal cancer screening   Follow-up in 1 years for next Gyn exam and 3 years for next Pap.   Next office visit for recheck of chronic medical conditions is due in  1 year

## 2025-01-19 LAB
HPV I/H RISK 1 DNA SPEC QL NAA+PROBE: DETECTED
HPV16 DNA CVX QL PROBE+SIG AMP: NOT DETECTED
HPV18 DNA CVX QL PROBE+SIG AMP: NOT DETECTED
SPECIMEN SOURCE: ABNORMAL
SPECIMEN SOURCE: NORMAL
THINPREP PAP, CYTOLOGY NL11781: NORMAL

## 2025-04-12 ENCOUNTER — HOSPITAL ENCOUNTER (OUTPATIENT)
Dept: LAB | Facility: MEDICAL CENTER | Age: 45
End: 2025-04-12
Attending: FAMILY MEDICINE
Payer: COMMERCIAL

## 2025-04-12 ENCOUNTER — OFFICE VISIT (OUTPATIENT)
Dept: URGENT CARE | Facility: CLINIC | Age: 45
End: 2025-04-12
Payer: COMMERCIAL

## 2025-04-12 VITALS
RESPIRATION RATE: 16 BRPM | OXYGEN SATURATION: 97 % | SYSTOLIC BLOOD PRESSURE: 120 MMHG | HEART RATE: 88 BPM | DIASTOLIC BLOOD PRESSURE: 74 MMHG | HEIGHT: 69 IN | WEIGHT: 175 LBS | TEMPERATURE: 98.4 F | BODY MASS INDEX: 25.92 KG/M2

## 2025-04-12 DIAGNOSIS — M25.50 POLYARTHRALGIA: ICD-10-CM

## 2025-04-12 DIAGNOSIS — M25.449 SWELLING OF HAND JOINT, UNSPECIFIED LATERALITY: ICD-10-CM

## 2025-04-12 LAB
ALBUMIN SERPL BCP-MCNC: 4 G/DL (ref 3.2–4.9)
ALBUMIN/GLOB SERPL: 1.4 G/DL
ALP SERPL-CCNC: 88 U/L (ref 30–99)
ALT SERPL-CCNC: 33 U/L (ref 2–50)
ANION GAP SERPL CALC-SCNC: 9 MMOL/L (ref 7–16)
AST SERPL-CCNC: 37 U/L (ref 12–45)
BASOPHILS # BLD AUTO: 0.5 % (ref 0–1.8)
BASOPHILS # BLD: 0.02 K/UL (ref 0–0.12)
BILIRUB SERPL-MCNC: 0.3 MG/DL (ref 0.1–1.5)
BUN SERPL-MCNC: 13 MG/DL (ref 8–22)
CALCIUM ALBUM COR SERPL-MCNC: 8.8 MG/DL (ref 8.5–10.5)
CALCIUM SERPL-MCNC: 8.8 MG/DL (ref 8.5–10.5)
CHLORIDE SERPL-SCNC: 107 MMOL/L (ref 96–112)
CO2 SERPL-SCNC: 22 MMOL/L (ref 20–33)
CREAT SERPL-MCNC: 0.71 MG/DL (ref 0.5–1.4)
CRP SERPL HS-MCNC: 2.26 MG/DL (ref 0–0.75)
EOSINOPHIL # BLD AUTO: 0.21 K/UL (ref 0–0.51)
EOSINOPHIL NFR BLD: 5.6 % (ref 0–6.9)
ERYTHROCYTE [DISTWIDTH] IN BLOOD BY AUTOMATED COUNT: 45.8 FL (ref 35.9–50)
ERYTHROCYTE [SEDIMENTATION RATE] IN BLOOD BY WESTERGREN METHOD: 28 MM/HOUR (ref 0–25)
GFR SERPLBLD CREATININE-BSD FMLA CKD-EPI: 106 ML/MIN/1.73 M 2
GLOBULIN SER CALC-MCNC: 2.8 G/DL (ref 1.9–3.5)
GLUCOSE SERPL-MCNC: 100 MG/DL (ref 65–99)
HCT VFR BLD AUTO: 34.1 % (ref 37–47)
HGB BLD-MCNC: 10.3 G/DL (ref 12–16)
IMM GRANULOCYTES # BLD AUTO: 0.02 K/UL (ref 0–0.11)
IMM GRANULOCYTES NFR BLD AUTO: 0.5 % (ref 0–0.9)
LYMPHOCYTES # BLD AUTO: 0.64 K/UL (ref 1–4.8)
LYMPHOCYTES NFR BLD: 17 % (ref 22–41)
MCH RBC QN AUTO: 24.5 PG (ref 27–33)
MCHC RBC AUTO-ENTMCNC: 30.2 G/DL (ref 32.2–35.5)
MCV RBC AUTO: 81.2 FL (ref 81.4–97.8)
MONOCYTES # BLD AUTO: 0.13 K/UL (ref 0–0.85)
MONOCYTES NFR BLD AUTO: 3.5 % (ref 0–13.4)
NEUTROPHILS # BLD AUTO: 2.74 K/UL (ref 1.82–7.42)
NEUTROPHILS NFR BLD: 72.9 % (ref 44–72)
NRBC # BLD AUTO: 0 K/UL
NRBC BLD-RTO: 0 /100 WBC (ref 0–0.2)
PLATELET # BLD AUTO: 185 K/UL (ref 164–446)
PMV BLD AUTO: 11.8 FL (ref 9–12.9)
POTASSIUM SERPL-SCNC: 4.2 MMOL/L (ref 3.6–5.5)
PROT SERPL-MCNC: 6.8 G/DL (ref 6–8.2)
RBC # BLD AUTO: 4.2 M/UL (ref 4.2–5.4)
RHEUMATOID FACT SER IA-ACNC: 15 IU/ML (ref 0–14)
SODIUM SERPL-SCNC: 138 MMOL/L (ref 135–145)
TSH SERPL DL<=0.005 MIU/L-ACNC: 1.68 UIU/ML (ref 0.38–5.33)
WBC # BLD AUTO: 3.8 K/UL (ref 4.8–10.8)

## 2025-04-12 PROCEDURE — 86431 RHEUMATOID FACTOR QUANT: CPT

## 2025-04-12 PROCEDURE — 3074F SYST BP LT 130 MM HG: CPT | Performed by: FAMILY MEDICINE

## 2025-04-12 PROCEDURE — 85025 COMPLETE CBC W/AUTO DIFF WBC: CPT

## 2025-04-12 PROCEDURE — 84443 ASSAY THYROID STIM HORMONE: CPT

## 2025-04-12 PROCEDURE — 86200 CCP ANTIBODY: CPT

## 2025-04-12 PROCEDURE — 86140 C-REACTIVE PROTEIN: CPT

## 2025-04-12 PROCEDURE — 80053 COMPREHEN METABOLIC PANEL: CPT

## 2025-04-12 PROCEDURE — 86225 DNA ANTIBODY NATIVE: CPT

## 2025-04-12 PROCEDURE — 86235 NUCLEAR ANTIGEN ANTIBODY: CPT

## 2025-04-12 PROCEDURE — 86038 ANTINUCLEAR ANTIBODIES: CPT

## 2025-04-12 PROCEDURE — 85652 RBC SED RATE AUTOMATED: CPT

## 2025-04-12 PROCEDURE — 3078F DIAST BP <80 MM HG: CPT | Performed by: FAMILY MEDICINE

## 2025-04-12 PROCEDURE — 36415 COLL VENOUS BLD VENIPUNCTURE: CPT

## 2025-04-12 PROCEDURE — 99214 OFFICE O/P EST MOD 30 MIN: CPT | Performed by: FAMILY MEDICINE

## 2025-04-12 PROCEDURE — 86039 ANTINUCLEAR ANTIBODIES (ANA): CPT

## 2025-04-12 PROCEDURE — 86256 FLUORESCENT ANTIBODY TITER: CPT

## 2025-04-12 RX ORDER — PREDNISONE 10 MG/1
TABLET ORAL
Qty: 11 TABLET | Refills: 0 | Status: SHIPPED | OUTPATIENT
Start: 2025-04-12 | End: 2025-04-22

## 2025-04-12 ASSESSMENT — ENCOUNTER SYMPTOMS: FEVER: 0

## 2025-04-12 ASSESSMENT — FIBROSIS 4 INDEX: FIB4 SCORE: 1.06

## 2025-04-12 NOTE — PROGRESS NOTES
"Subjective:     Estrellita Baker is a 44 y.o. female who presents for Edema (Hand swelling, joint pain x2days)    HPI  Pt presents for evaluation of an acute problem  Pt with new bilateral hand pain and swelling   Has pain in the knuckles and the wrists   Feeling very painful   Has stiffness in the mornings  No skin changes   Has some intermittent tingling in the fingers   Has never had swelling/pain like this before     Review of Systems   Constitutional:  Negative for fever.   Musculoskeletal:  Positive for joint pain.     PMH:  has a past medical history of Contraceptive, surveillance, intrauterine device (07/15/2019) and Diabetes ([facility]).  MEDS:   Current Outpatient Medications:     predniSONE (DELTASONE) 10 MG Tab, Take 2 Tablets by mouth every day for 3 days, THEN 1 Tablet every day for 3 days, THEN 0.5 Tablets every day for 4 days., Disp: 11 Tablet, Rfl: 0    atorvastatin (LIPITOR) 10 MG Tab, Take 1 Tablet by mouth every evening., Disp: 30 Tablet, Rfl: 11    vitamin D2, Ergocalciferol, (DRISDOL) 1.25 MG (42814 UT) Cap capsule, Take  by mouth every 7 days., Disp: , Rfl:     multivitamin Tab, Take 1 Tablet by mouth every day., Disp: , Rfl:     amphetamine-dextroamphetamine (ADDERALL) 20 MG Tab, , Disp: , Rfl:     FLUoxetine (PROZAC) 20 MG Cap, Take 1 Cap by mouth every day., Disp: 30 Cap, Rfl: 3  ALLERGIES:   Allergies   Allergen Reactions    Rosuvastatin Unspecified     Vaginal discomfort , fish odor      SURGHX:   Past Surgical History:   Procedure Laterality Date    OTHER      wisdom teeth     TONSILLECTOMY       SOCHX:  reports that she has never smoked. She has never used smokeless tobacco. She reports current alcohol use. She reports that she does not use drugs.     Objective:   /74 (BP Location: Left arm, Patient Position: Sitting, BP Cuff Size: Adult)   Pulse 88   Temp 36.9 °C (98.4 °F) (Temporal)   Resp 16   Ht 1.753 m (5' 9\")   Wt 79.4 kg (175 lb)   SpO2 97%   BMI 25.84 kg/m² "     Physical Exam  Constitutional:       General: She is not in acute distress.     Appearance: She is well-developed. She is not diaphoretic.   Pulmonary:      Effort: Pulmonary effort is normal.   Neurological:      Mental Status: She is alert.     Bilateral hands with mild swelling of the MCP joints and wrists, no erythema, no bruising   +TTP mildly throughout the hand     Assessment/Plan:   Assessment    1. Swelling of hand joint, unspecified laterality  - CBC WITH DIFFERENTIAL; Future  - Comp Metabolic Panel; Future  - TSH WITH REFLEX TO FT4; Future  - LATOYA REFLEXIVE PROFILE; Future  - CCP ANTIBODIES, IGG/IGA; Future  - RHEUMATOID ARTHRITIS FACTOR; Future  - CRP QUANTITIVE (NON-CARDIAC); Future  - Sed Rate; Future  - predniSONE (DELTASONE) 10 MG Tab; Take 2 Tablets by mouth every day for 3 days, THEN 1 Tablet every day for 3 days, THEN 0.5 Tablets every day for 4 days.  Dispense: 11 Tablet; Refill: 0    2. Polyarthralgia  - CBC WITH DIFFERENTIAL; Future  - Comp Metabolic Panel; Future  - TSH WITH REFLEX TO FT4; Future  - LATOYA REFLEXIVE PROFILE; Future  - CCP ANTIBODIES, IGG/IGA; Future  - RHEUMATOID ARTHRITIS FACTOR; Future  - CRP QUANTITIVE (NON-CARDIAC); Future  - Sed Rate; Future  - predniSONE (DELTASONE) 10 MG Tab; Take 2 Tablets by mouth every day for 3 days, THEN 1 Tablet every day for 3 days, THEN 0.5 Tablets every day for 4 days.  Dispense: 11 Tablet; Refill: 0    Patient with swelling of bilateral hands and wrists.  Started without fall or injury and now having stiffness as well.  No skin changes, do not suspect gout.  No sign of infection.  Concern for autoimmune process and recommended further workup with labs.  May start prednisone taper once the labs are drawn and will follow-up test results.

## 2025-04-14 LAB
CCP IGA+IGG SERPL IA-ACNC: 4 UNITS (ref 0–19)
NUCLEAR IGG SER QL IA: DETECTED

## 2025-04-15 ENCOUNTER — RESULTS FOLLOW-UP (OUTPATIENT)
Dept: URGENT CARE | Facility: PHYSICIAN GROUP | Age: 45
End: 2025-04-15
Payer: COMMERCIAL

## 2025-04-15 DIAGNOSIS — M05.80 POLYARTHRITIS WITH POSITIVE RHEUMATOID FACTOR (HCC): ICD-10-CM

## 2025-04-15 DIAGNOSIS — M25.50 POLYARTHRALGIA: ICD-10-CM

## 2025-04-15 DIAGNOSIS — M25.449 SWELLING OF HAND JOINT, UNSPECIFIED LATERALITY: ICD-10-CM

## 2025-04-15 DIAGNOSIS — R76.8 POSITIVE ANA (ANTINUCLEAR ANTIBODY): ICD-10-CM

## 2025-04-15 NOTE — TELEPHONE ENCOUNTER
Plan discussed test results.  Given referral to rheumatology.  Finish course of steroids and follow-up with rheumatology.

## 2025-04-16 LAB
ANA PAT SER IF-IMP: ABNORMAL
ANA PAT SER IF-IMP: ABNORMAL
NUCLEAR IGG SER QL IF: DETECTED
NUCLEAR IGG TITR SER IF: ABNORMAL {TITER}

## 2025-04-17 LAB
DSDNA AB TITR SER CLIF: NORMAL {TITER}
U1 SNRNP IGG SER QL: 4 UNITS (ref 0–19)

## 2025-04-18 LAB
DSDNA IGG TITR SER CLIF: ABNORMAL {TITER}
ENA JO1 AB TITR SER: 3 AU/ML (ref 0–40)
ENA SCL70 IGG SER QL: 3 AU/ML (ref 0–40)
ENA SM IGG SER-ACNC: 3 AU/ML (ref 0–40)
ENA SS-A 60KD AB SER-ACNC: 1 AU/ML (ref 0–40)
ENA SS-A IGG SER QL: 3 AU/ML (ref 0–40)
ENA SS-B IGG SER IA-ACNC: 1 AU/ML (ref 0–40)

## 2025-04-23 NOTE — Clinical Note
REFERRAL APPROVAL NOTICE         Sent on April 22, 2025                   Estrellita Baker  [address] [facility] [place] [address]                   Dear Ms. Baker,    After a careful review of the medical information and benefit coverage, [facility] has processed your referral. See below for additional details.    If applicable, you must be actively enrolled with your insurance for coverage of the authorized service. If you have any questions regarding your coverage, please contact your insurance directly.    REFERRAL INFORMATION   Referral #:  [id number]  Referred-To Department    Referred-By Provider:  Rheumatology    Navid Ayala M.D.   [place] [facility] [place]      [address] [facility] [place] [address]  [phone] [address], [address]  [place] [id number] [phone]    Referral Start Date:  04/15/2025  Referral End Date:   04/15/2026           SCHEDULING  If you do not already have an appointment, please call [phone] to make an appointment.   MORE INFORMATION  As a reminder, [facility] ownership has changed, meaning this location is now owned and operated by [facility]. As such, we want to clarify that our patients should expect to receive two separate bills for the services received at [facility] - one representing the [facility] facility fees as the owner of the establishment, and the other to represent the physician's services and subsequent fees. You can speak with your insurance carrier for a pricing estimate by calling the customer service number on the back of your card and ask about charges for a hospital outpatient visit.  If you do not already have a [email or web address] account, sign up at: [email or web address]  You can access your medical information, make appointments, see lab results, billing information, and more.  If you have questions regarding this referral, please contact  the [facility] Referrals department at:             [phone]. Monday -  Friday 7:30AM - 5:00PM.      Sincerely,  [facility]

## 2025-04-25 ENCOUNTER — PATIENT MESSAGE (OUTPATIENT)
Dept: MEDICAL GROUP | Facility: MEDICAL CENTER | Age: 45
End: 2025-04-25
Payer: COMMERCIAL

## 2025-05-23 ENCOUNTER — HOSPITAL ENCOUNTER (OUTPATIENT)
Facility: MEDICAL CENTER | Age: 45
End: 2025-05-23
Attending: NURSE PRACTITIONER
Payer: COMMERCIAL

## 2025-05-23 ENCOUNTER — HOSPITAL ENCOUNTER (OUTPATIENT)
Facility: MEDICAL CENTER | Age: 45
End: 2025-05-23
Attending: FAMILY MEDICINE
Payer: COMMERCIAL

## 2025-05-23 DIAGNOSIS — E78.00 PRIMARY HYPERCHOLESTEROLEMIA: ICD-10-CM

## 2025-05-23 LAB
25(OH)D3 SERPL-MCNC: 67 NG/ML (ref 30–100)
ALBUMIN SERPL BCP-MCNC: 4.7 G/DL (ref 3.2–4.9)
ALBUMIN SERPL BCP-MCNC: 4.7 G/DL (ref 3.2–4.9)
ALBUMIN/GLOB SERPL: 1.5 G/DL
ALBUMIN/GLOB SERPL: 1.6 G/DL
ALP SERPL-CCNC: 63 U/L (ref 30–99)
ALP SERPL-CCNC: 64 U/L (ref 30–99)
ALT SERPL-CCNC: 25 U/L (ref 2–50)
ALT SERPL-CCNC: 26 U/L (ref 2–50)
ANION GAP SERPL CALC-SCNC: 12 MMOL/L (ref 7–16)
ANION GAP SERPL CALC-SCNC: 13 MMOL/L (ref 7–16)
APPEARANCE UR: CLEAR
AST SERPL-CCNC: 25 U/L (ref 12–45)
AST SERPL-CCNC: 27 U/L (ref 12–45)
BACTERIA #/AREA URNS HPF: ABNORMAL /HPF
BASOPHILS # BLD AUTO: 1.4 % (ref 0–1.8)
BASOPHILS # BLD: 0.08 K/UL (ref 0–0.12)
BILIRUB SERPL-MCNC: 0.4 MG/DL (ref 0.1–1.5)
BILIRUB SERPL-MCNC: 0.5 MG/DL (ref 0.1–1.5)
BILIRUB UR QL STRIP.AUTO: NEGATIVE
BUN SERPL-MCNC: 16 MG/DL (ref 8–22)
BUN SERPL-MCNC: 17 MG/DL (ref 8–22)
C3 SERPL-MCNC: 152 MG/DL (ref 87–200)
C4 SERPL-MCNC: 22.9 MG/DL (ref 19–52)
CALCIUM ALBUM COR SERPL-MCNC: 9 MG/DL (ref 8.5–10.5)
CALCIUM ALBUM COR SERPL-MCNC: 9 MG/DL (ref 8.5–10.5)
CALCIUM SERPL-MCNC: 9.6 MG/DL (ref 8.5–10.5)
CALCIUM SERPL-MCNC: 9.6 MG/DL (ref 8.5–10.5)
CASTS URNS QL MICRO: ABNORMAL /LPF (ref 0–2)
CHLORIDE SERPL-SCNC: 104 MMOL/L (ref 96–112)
CHLORIDE SERPL-SCNC: 105 MMOL/L (ref 96–112)
CHOLEST SERPL-MCNC: 166 MG/DL (ref 100–199)
CO2 SERPL-SCNC: 21 MMOL/L (ref 20–33)
CO2 SERPL-SCNC: 21 MMOL/L (ref 20–33)
COLOR UR: YELLOW
CREAT SERPL-MCNC: 0.89 MG/DL (ref 0.5–1.4)
CREAT SERPL-MCNC: 0.94 MG/DL (ref 0.5–1.4)
CREAT UR-MCNC: 116 MG/DL
CRP SERPL HS-MCNC: <0.3 MG/DL (ref 0–0.75)
EOSINOPHIL # BLD AUTO: 0.44 K/UL (ref 0–0.51)
EOSINOPHIL NFR BLD: 7.8 % (ref 0–6.9)
EPITHELIAL CELLS 1715: ABNORMAL /HPF (ref 0–5)
ERYTHROCYTE [DISTWIDTH] IN BLOOD BY AUTOMATED COUNT: 47.6 FL (ref 35.9–50)
FOLATE SERPL-MCNC: 28.6 NG/ML
GFR SERPLBLD CREATININE-BSD FMLA CKD-EPI: 76 ML/MIN/1.73 M 2
GFR SERPLBLD CREATININE-BSD FMLA CKD-EPI: 81 ML/MIN/1.73 M 2
GLOBULIN SER CALC-MCNC: 3 G/DL (ref 1.9–3.5)
GLOBULIN SER CALC-MCNC: 3.1 G/DL (ref 1.9–3.5)
GLUCOSE SERPL-MCNC: 73 MG/DL (ref 65–99)
GLUCOSE SERPL-MCNC: 74 MG/DL (ref 65–99)
GLUCOSE UR STRIP.AUTO-MCNC: NEGATIVE MG/DL
HBV CORE AB SERPL QL IA: NONREACTIVE
HBV SURFACE AB SERPL IA-ACNC: 457 MIU/ML (ref 0–10)
HBV SURFACE AG SER QL: NORMAL
HCT VFR BLD AUTO: 40.1 % (ref 37–47)
HCV AB SER QL: NORMAL
HDLC SERPL-MCNC: 57 MG/DL
HGB BLD-MCNC: 12.7 G/DL (ref 12–16)
IMM GRANULOCYTES # BLD AUTO: 0.01 K/UL (ref 0–0.11)
IMM GRANULOCYTES NFR BLD AUTO: 0.2 % (ref 0–0.9)
KETONES UR STRIP.AUTO-MCNC: 15 MG/DL
LDLC SERPL CALC-MCNC: 92 MG/DL
LEUKOCYTE ESTERASE UR QL STRIP.AUTO: ABNORMAL
LYMPHOCYTES # BLD AUTO: 1.39 K/UL (ref 1–4.8)
LYMPHOCYTES NFR BLD: 24.6 % (ref 22–41)
MCH RBC QN AUTO: 25.1 PG (ref 27–33)
MCHC RBC AUTO-ENTMCNC: 31.7 G/DL (ref 32.2–35.5)
MCV RBC AUTO: 79.2 FL (ref 81.4–97.8)
MICRO URNS: ABNORMAL
MONOCYTES # BLD AUTO: 0.43 K/UL (ref 0–0.85)
MONOCYTES NFR BLD AUTO: 7.6 % (ref 0–13.4)
NEUTROPHILS # BLD AUTO: 3.31 K/UL (ref 1.82–7.42)
NEUTROPHILS NFR BLD: 58.4 % (ref 44–72)
NITRITE UR QL STRIP.AUTO: NEGATIVE
NRBC # BLD AUTO: 0 K/UL
NRBC BLD-RTO: 0 /100 WBC (ref 0–0.2)
PH UR STRIP.AUTO: 6 [PH] (ref 5–8)
PLATELET # BLD AUTO: 252 K/UL (ref 164–446)
PMV BLD AUTO: 11.8 FL (ref 9–12.9)
POTASSIUM SERPL-SCNC: 3.9 MMOL/L (ref 3.6–5.5)
POTASSIUM SERPL-SCNC: 4 MMOL/L (ref 3.6–5.5)
PROT SERPL-MCNC: 7.7 G/DL (ref 6–8.2)
PROT SERPL-MCNC: 7.8 G/DL (ref 6–8.2)
PROT UR QL STRIP: NEGATIVE MG/DL
PROT UR-MCNC: 9.1 MG/DL (ref 0–15)
PROT/CREAT UR: 78 MG/G (ref 10–107)
RBC # BLD AUTO: 5.06 M/UL (ref 4.2–5.4)
RBC # URNS HPF: ABNORMAL /HPF (ref 0–2)
RBC UR QL AUTO: ABNORMAL
SODIUM SERPL-SCNC: 138 MMOL/L (ref 135–145)
SODIUM SERPL-SCNC: 138 MMOL/L (ref 135–145)
SP GR UR STRIP.AUTO: 1.01
TRIGL SERPL-MCNC: 84 MG/DL (ref 0–149)
UROBILINOGEN UR STRIP.AUTO-MCNC: 1 EU/DL
VIT B12 SERPL-MCNC: 701 PG/ML (ref 211–911)
WBC # BLD AUTO: 5.7 K/UL (ref 4.8–10.8)
WBC #/AREA URNS HPF: ABNORMAL /HPF

## 2025-05-23 PROCEDURE — 86225 DNA ANTIBODY NATIVE: CPT

## 2025-05-23 PROCEDURE — 86364 TISS TRNSGLTMNASE EA IG CLAS: CPT

## 2025-05-23 PROCEDURE — 87340 HEPATITIS B SURFACE AG IA: CPT

## 2025-05-23 PROCEDURE — 82746 ASSAY OF FOLIC ACID SERUM: CPT

## 2025-05-23 PROCEDURE — 83516 IMMUNOASSAY NONANTIBODY: CPT

## 2025-05-23 PROCEDURE — 86140 C-REACTIVE PROTEIN: CPT

## 2025-05-23 PROCEDURE — 86160 COMPLEMENT ANTIGEN: CPT

## 2025-05-23 PROCEDURE — 36415 COLL VENOUS BLD VENIPUNCTURE: CPT

## 2025-05-23 PROCEDURE — 86258 DGP ANTIBODY EACH IG CLASS: CPT

## 2025-05-23 PROCEDURE — 82607 VITAMIN B-12: CPT

## 2025-05-23 PROCEDURE — 86706 HEP B SURFACE ANTIBODY: CPT

## 2025-05-23 PROCEDURE — 84156 ASSAY OF PROTEIN URINE: CPT

## 2025-05-23 PROCEDURE — 81001 URINALYSIS AUTO W/SCOPE: CPT

## 2025-05-23 PROCEDURE — 86162 COMPLEMENT TOTAL (CH50): CPT

## 2025-05-23 PROCEDURE — 85025 COMPLETE CBC W/AUTO DIFF WBC: CPT

## 2025-05-23 PROCEDURE — 82570 ASSAY OF URINE CREATININE: CPT

## 2025-05-23 PROCEDURE — 82784 ASSAY IGA/IGD/IGG/IGM EACH: CPT

## 2025-05-23 PROCEDURE — 82172 ASSAY OF APOLIPOPROTEIN: CPT

## 2025-05-23 PROCEDURE — 85652 RBC SED RATE AUTOMATED: CPT

## 2025-05-23 PROCEDURE — 86704 HEP B CORE ANTIBODY TOTAL: CPT

## 2025-05-23 PROCEDURE — 82306 VITAMIN D 25 HYDROXY: CPT

## 2025-05-23 PROCEDURE — 80053 COMPREHEN METABOLIC PANEL: CPT | Mod: 91

## 2025-05-23 PROCEDURE — 80061 LIPID PANEL: CPT

## 2025-05-23 PROCEDURE — 86480 TB TEST CELL IMMUN MEASURE: CPT

## 2025-05-23 PROCEDURE — 84433 ASY THIOPURIN S-MTHYLTRNSFRS: CPT

## 2025-05-23 PROCEDURE — 86803 HEPATITIS C AB TEST: CPT

## 2025-05-23 PROCEDURE — 80053 COMPREHEN METABOLIC PANEL: CPT

## 2025-05-25 LAB
APO B100 SERPL-MCNC: 78 MG/DL (ref 60–117)
CHROMATIN IGG SERPL-ACNC: 8 UNITS (ref 0–19)
DSDNA AB TITR SER CLIF: NORMAL {TITER}
GAMMA INTERFERON BACKGROUND BLD IA-ACNC: 0.04 IU/ML
GLIADIN IGA SER IA-ACNC: <0.72 FLU (ref 0–4.99)
M TB IFN-G BLD-IMP: NEGATIVE
M TB IFN-G CD4+ BCKGRND COR BLD-ACNC: 0 IU/ML
MITOGEN IGNF BCKGRD COR BLD-ACNC: >10 IU/ML
QFT TB2 - NIL TBQ2: 0 IU/ML
TTG IGA SER IA-ACNC: <1.02 FLU (ref 0–4.99)

## 2025-05-26 LAB
CH50 SERPL-ACNC: 63.2 U/ML (ref 38.7–89.9)
IGA SERPL-MCNC: 113 MG/DL (ref 68–408)

## 2025-05-28 LAB
HISTONE IGG SER IA-ACNC: 0.5 UNITS (ref 0–0.9)
TPMT BLD-CCNC: 23.7 U/ML (ref 24–44)

## 2025-06-11 ENCOUNTER — OFFICE VISIT (OUTPATIENT)
Dept: VASCULAR LAB | Facility: MEDICAL CENTER | Age: 45
End: 2025-06-11
Attending: NURSE PRACTITIONER
Payer: COMMERCIAL

## 2025-06-11 VITALS
WEIGHT: 165 LBS | DIASTOLIC BLOOD PRESSURE: 83 MMHG | HEART RATE: 89 BPM | BODY MASS INDEX: 24.44 KG/M2 | SYSTOLIC BLOOD PRESSURE: 119 MMHG | HEIGHT: 69 IN

## 2025-06-11 DIAGNOSIS — E78.00 PRIMARY HYPERCHOLESTEROLEMIA: ICD-10-CM

## 2025-06-11 DIAGNOSIS — E78.41 ELEVATED LIPOPROTEIN(A): Primary | ICD-10-CM

## 2025-06-11 PROCEDURE — 99214 OFFICE O/P EST MOD 30 MIN: CPT | Performed by: NURSE PRACTITIONER

## 2025-06-11 PROCEDURE — 3074F SYST BP LT 130 MM HG: CPT | Performed by: NURSE PRACTITIONER

## 2025-06-11 PROCEDURE — 99212 OFFICE O/P EST SF 10 MIN: CPT

## 2025-06-11 PROCEDURE — 3079F DIAST BP 80-89 MM HG: CPT | Performed by: NURSE PRACTITIONER

## 2025-06-11 RX ORDER — HYDROXYCHLOROQUINE SULFATE 200 MG/1
200 TABLET, FILM COATED ORAL 2 TIMES DAILY
COMMUNITY

## 2025-06-11 ASSESSMENT — ENCOUNTER SYMPTOMS
CHILLS: 0
SHORTNESS OF BREATH: 0
SPUTUM PRODUCTION: 0
PND: 0
ORTHOPNEA: 0
CLAUDICATION: 0
HEMOPTYSIS: 0
WHEEZING: 0
PALPITATIONS: 0
FEVER: 0
COUGH: 0

## 2025-06-11 ASSESSMENT — FIBROSIS 4 INDEX: FIB4 SCORE: .985714285714285714

## 2025-06-11 NOTE — PROGRESS NOTES
FOLLOW-UP FAM[facility]  06/11/2025    Estrellita Baker has been referred for evaluation and mgmt of dyslipidemia, Est 10/2024   Referral Source: Corazon Prasad M.D.     Subjective      Interval hx/concerns:   Recently diagnosed with lupus   She is seeing Rheum APRN at [facility]-- will request note  Started on Plaquenil - tolerating well     CURRENT MED MGMT  Current Rx's:   Statin: atorva 10mg   Non-Statin: None  Supplements: None  Current adverse drug reactions/side effects? N/a  Adherence? N\A    LIFESTYLE MGMT  Change in weight: Stable  Exercise habits: minimal exercise  Dietary patterns: increased salad, veg - reduced red meat, chicken, turkey.   Etoh: see sochx  Barriers to care/SDOH: none  Tobacco:  reports that she has never smoked. She has never used smokeless tobacco.     PERTINENT HLD PMHX  Initial visit history: seen by PCP 9/3/24 for AWV, had labs showing high , lp(a) 117 mg/dl.  Advised to continue healthy diet and exercise and ref to our clinic for further review. Pt reports no discussion of high lp(a) completed to date.   Age at Initial Diagnosis of Dyslipidemia: 40s    Baseline Lipids Prior to Treatment:    Latest Reference Range & Units 08/30/24 11:52   Cholesterol,Tot 100 - 199 mg/dL 243 (H)   Triglycerides 0 - 149 mg/dL 105   HDL >=40 mg/dL 57   LDL <100 mg/dL 165 (H)   Lipoprotein (a) <=29 mg/dL 117 (H)     History of ASCVD: None  Other Established (non-atherosclerotic) Vascular Disease: None  Secondary contributors/causes of dyslipidemia:  NONE   Previously Attempted Interventions for Lipids - including outcome  Statin: rosuvastatin   Outcome: vaginitis  Non-Statin: none   Outcome: not applicable    OTHER CV RISK FACTORS:    HTN: no prior dx or meds    Dysglycemia: no  Antithrombotic tx: no     Current Outpatient Medications on File Prior to Visit   Medication Sig Dispense Refill    hydroxychloroquine (PLAQUENIL) 200 MG Tab Take 200 mg by mouth 2 times a day.       "Tirzepatide-Weight Management 5 MG/0.5ML Solution vial Inject 5 mg under the skin every 7 days.      atorvastatin (LIPITOR) 10 MG Tab Take 1 Tablet by mouth every evening. 30 Tablet 11    vitamin D2, Ergocalciferol, (DRISDOL) 1.25 MG (29983 UT) Cap capsule Take  by mouth every 7 days.      multivitamin Tab Take 1 Tablet by mouth every day.      amphetamine-dextroamphetamine (ADDERALL) 20 MG Tab       FLUoxetine (PROZAC) 20 MG Cap Take 1 Cap by mouth every day. 30 Cap 3     No current facility-administered medications on file prior to visit.      Allergies   Allergen Reactions    Rosuvastatin Unspecified     Vaginal discomfort , fish odor       Social History     Tobacco Use    Smoking status: Never    Smokeless tobacco: Never   Vaping Use    Vaping status: Never Used   Substance Use Topics    Alcohol use: Yes     Comment: socially    Drug use: Never     Review of Systems   Constitutional:  Negative for chills, fever and malaise/fatigue.   Respiratory:  Negative for cough, hemoptysis, sputum production, shortness of breath and wheezing.    Cardiovascular:  Negative for chest pain, palpitations, orthopnea, claudication, leg swelling and PND.         Objective    Vitals:    06/11/25 1040   BP: 119/83   BP Location: Left arm   Patient Position: Sitting   BP Cuff Size: Adult   Pulse: 89   Weight: 74.8 kg (165 lb)   Height: 1.753 m (5' 9\")     BP Readings from Last 5 Encounters:   06/11/25 119/83   04/12/25 120/74   01/14/25 106/80   12/10/24 113/80   11/22/24 120/60     BMI Readings from Last 1 Encounters:   06/11/25 24.37 kg/m²     Wt Readings from Last 3 Encounters:   06/11/25 74.8 kg (165 lb)   04/12/25 79.4 kg (175 lb)   01/14/25 80.1 kg (176 lb 8 oz)     Physical Exam  Constitutional:       General: She is not in acute distress.     Appearance: Normal appearance. She is not ill-appearing.   HENT:      Head: Normocephalic and atraumatic.   Eyes:      Conjunctiva/sclera: Conjunctivae normal.      Comments: No corneal " arcus   Neck:      Vascular: No carotid bruit.   Cardiovascular:      Rate and Rhythm: Normal rate and regular rhythm.      Pulses:           Carotid pulses are 2+ on the right side and 2+ on the left side.       Radial pulses are 2+ on the right side and 2+ on the left side.        Popliteal pulses are 2+ on the right side and 2+ on the left side.        Dorsalis pedis pulses are 2+ on the right side and 2+ on the left side.      Heart sounds: No murmur heard.  Pulmonary:      Effort: Pulmonary effort is normal.      Breath sounds: Normal breath sounds.   Musculoskeletal:      Cervical back: Normal range of motion.      Right lower leg: No edema.      Left lower leg: No edema.   Feet:      Comments: No achilles thickening or nodularity  Skin:     General: Skin is warm and dry.      Coloration: Skin is not cyanotic or mottled.      Findings: No wound.      Comments: No tendon xanthomas   Neurological:      General: No focal deficit present.      Mental Status: She is alert.   Psychiatric:         Mood and Affect: Mood normal.       DATA REVIEW:  Most Recent Lipid Panel:   Lab Results   Component Value Date/Time    CHOLSTRLTOT 166 05/23/2025 10:47 AM    LDL 92 05/23/2025 10:47 AM    HDL 57 05/23/2025 10:47 AM    TRIGLYCERIDE 84 05/23/2025 10:47 AM     Lab Results   Component Value Date/Time    LDL 92 05/23/2025 10:47 AM    LDL 90 12/03/2024 07:24 AM     (H) 08/30/2024 11:52 AM     (H) 05/24/2019 09:11 AM      Lab Results   Component Value Date/Time    LIPOPROTA 117 (H) 08/30/2024 11:52 AM      Lab Results   Component Value Date/Time    APOB 78 05/23/2025 10:47 AM    APOB 69 12/03/2024 07:24 AM      Lab Results   Component Value Date/Time    CRPHIGHSEN 0.9 12/03/2024 07:24 AM       Other Pertinent Blood Work:   Lab Results   Component Value Date    SODIUM 138 05/23/2025    POTASSIUM 4.0 05/23/2025    CHLORIDE 104 05/23/2025    CO2 21 05/23/2025    ANION 13.0 05/23/2025    GLUCOSE 74 05/23/2025    BUN 17  "05/23/2025    CREATININE 0.94 05/23/2025    CALCIUM 9.6 05/23/2025    ASTSGOT 27 05/23/2025    ALTSGPT 25 05/23/2025    ALKPHOSPHAT 63 05/23/2025    TBILIRUBIN 0.5 05/23/2025    ALBUMIN 4.7 05/23/2025    AGRATIO 1.6 05/23/2025    TSHULTRASEN 1.680 04/12/2025     IMAGING: none     PROCEDURES: none          ASSESSMENT AND PLAN  1. Elevated lipoprotein(a)        2. Primary hypercholesterolemia          Patient Type: Primary Prevention    Major ASCVD events: None      Other established Vascular Disease: None    Evidence of genetic dyslipidemia: Yes     Elevated lp(a) = 117mg/dl, >3xULN, primary LP risk , higher risk for AV stenosis development. Has prothrombotic properties.  No murmur on exam  Plan:  - provided review and educ handouts from [email or web address]   - generally aggressive LDL-C lowering needed to reduce lp(a) driven risk   - see med mgmt below   - recommend for family screening of all 1st degree relatives   - has influence on treatment decisions relating to initiate or intensity of lipid lowering as well as aspirin therapy    - consider echo if murmur develops over time     FH genotyping: NO    ApoE genotyping: NO    Secondary causes/contributors: none identified     ACC/AHA Indication for Statin Therapy:  Primary Prevention - 40-74yo, LDLc >70, <190 w/o DM    ASCVD risk calculations  The 10-year ASCVD risk score (Akilah SANCHEZ, et al., 2019) is: 0.5%, <5% \"low risk\"    Lp(a) clinical guidance calculator:   Lifetime CVD risk w/o lp(a) = 9.0%  Lifetime CVD risk w/ lp(a) = 17.4%   With 65 mg/dl LDL-C reduction will return risk to 9.0%     Other Significant Risk Markers:  High-risk conditions: N/A  Risk-enhancers: Persistently elevated LDL-C >159 and Lp(a) >50mg/dL (>125 nmol/L)  Lipoprotein(a): Very High (>100 mg/dl or >225 nmol/L    Goal LDL-C and ApolipoproteinB/non-HDL-C:  LDL-C <100 mg/dl  apoB <90 mg/dl  At goals? No  - needs 40% LDL-C reduction to achieve goal     LIFESTYLE INTERVENTIONS  TOBACCO:  " reports that she has never smoked. She has never used smokeless tobacco.   - continued complete avoidance of all tobacco products   PHYSICAL ACTIVITY: at least 150 min per week of moderate intensity  NUTRITION: Mediterranean, reduce Na to 1500mg/day, and - handout provided   ETOH: limit to 1 or less standard drinks/day  WT MGMT: maintain current healthy weight    LIPID-LOWERING MEDICATION MANAGEMENT:     Statin Therapy:   - continue atorva 10mg daily - reviewed use, goals, ADRs     Non-Statin Meds: none    PCSK9 Inhibitor strategy indications: Not currently indicated    Recommended supplements: - continue vit D3 to replete levels and reduce myalgia risks     APHERESIS: n/a     BLOOD PRESSURE MANAGEMENT  Office BP Goal ACC/AHA (2017) goal <130/80  Home BP at goal:  yes  Office BP at goal:  yes  24h ABPM:  not indicated  RDN candidate? N/a  Contributing factors: n/a   Plan:   - monitor annual office-based BP and/or intermittent at home BP, report >130/80    - continue healthy lifestyle  Medications: none      GLYCEMIC STATUS: Normal    ANTITHROMBOTIC THERAPY Not currently recommended, may consider if future screening shows subclinical atherosclerosis     OTHER:    # SLE - defer to Rheumatology     # vit D def - initiate vit D3 4000 units daily, repeat lab 6-12mo     Studies consider CACS in future    Follow-Up:  6 months    EVIE Ratliff.  [facility]   [facility]   [phone]

## 2025-07-18 ENCOUNTER — APPOINTMENT (OUTPATIENT)
Dept: LAB | Facility: MEDICAL CENTER | Age: 45
End: 2025-07-18
Payer: COMMERCIAL

## 2025-07-24 ENCOUNTER — HOSPITAL ENCOUNTER (OUTPATIENT)
Dept: LAB | Facility: MEDICAL CENTER | Age: 45
End: 2025-07-24
Attending: NURSE PRACTITIONER
Payer: COMMERCIAL

## 2025-07-24 ENCOUNTER — APPOINTMENT (OUTPATIENT)
Dept: LAB | Facility: MEDICAL CENTER | Age: 45
End: 2025-07-24
Payer: COMMERCIAL

## 2025-07-24 LAB
ALBUMIN SERPL BCP-MCNC: 4.2 G/DL (ref 3.2–4.9)
ALBUMIN/GLOB SERPL: 1.7 G/DL
ALP SERPL-CCNC: 43 U/L (ref 30–99)
ALT SERPL-CCNC: 31 U/L (ref 2–50)
ANION GAP SERPL CALC-SCNC: 12 MMOL/L (ref 7–16)
APPEARANCE UR: ABNORMAL
AST SERPL-CCNC: 30 U/L (ref 12–45)
BACTERIA #/AREA URNS HPF: ABNORMAL /HPF
BASOPHILS # BLD AUTO: 1.1 % (ref 0–1.8)
BASOPHILS # BLD: 0.04 K/UL (ref 0–0.12)
BILIRUB SERPL-MCNC: 0.3 MG/DL (ref 0.1–1.5)
BILIRUB UR QL STRIP.AUTO: NEGATIVE
BUN SERPL-MCNC: 12 MG/DL (ref 8–22)
C3 SERPL-MCNC: 128 MG/DL (ref 87–200)
C4 SERPL-MCNC: 19.1 MG/DL (ref 19–52)
CALCIUM ALBUM COR SERPL-MCNC: 9.1 MG/DL (ref 8.5–10.5)
CALCIUM SERPL-MCNC: 9.3 MG/DL (ref 8.5–10.5)
CASTS URNS QL MICRO: ABNORMAL /LPF (ref 0–2)
CHLORIDE SERPL-SCNC: 107 MMOL/L (ref 96–112)
CO2 SERPL-SCNC: 22 MMOL/L (ref 20–33)
COLOR UR: YELLOW
CREAT SERPL-MCNC: 0.87 MG/DL (ref 0.5–1.4)
CREAT UR-MCNC: 139 MG/DL
CRP SERPL HS-MCNC: <0.3 MG/DL (ref 0–0.75)
EOSINOPHIL # BLD AUTO: 0.37 K/UL (ref 0–0.51)
EOSINOPHIL NFR BLD: 10.6 % (ref 0–6.9)
EPITHELIAL CELLS 1715: ABNORMAL /HPF (ref 0–5)
ERYTHROCYTE [DISTWIDTH] IN BLOOD BY AUTOMATED COUNT: 49.2 FL (ref 35.9–50)
ERYTHROCYTE [SEDIMENTATION RATE] IN BLOOD BY WESTERGREN METHOD: 7 MM/HOUR (ref 0–25)
GFR SERPLBLD CREATININE-BSD FMLA CKD-EPI: 83 ML/MIN/1.73 M 2
GLOBULIN SER CALC-MCNC: 2.5 G/DL (ref 1.9–3.5)
GLUCOSE SERPL-MCNC: 72 MG/DL (ref 65–99)
GLUCOSE UR STRIP.AUTO-MCNC: NEGATIVE MG/DL
HCT VFR BLD AUTO: 35.9 % (ref 37–47)
HGB BLD-MCNC: 11.6 G/DL (ref 12–16)
IMM GRANULOCYTES # BLD AUTO: 0 K/UL (ref 0–0.11)
IMM GRANULOCYTES NFR BLD AUTO: 0 % (ref 0–0.9)
KETONES UR STRIP.AUTO-MCNC: NEGATIVE MG/DL
LEUKOCYTE ESTERASE UR QL STRIP.AUTO: NEGATIVE
LYMPHOCYTES # BLD AUTO: 1.24 K/UL (ref 1–4.8)
LYMPHOCYTES NFR BLD: 35.5 % (ref 22–41)
MCH RBC QN AUTO: 26.9 PG (ref 27–33)
MCHC RBC AUTO-ENTMCNC: 32.3 G/DL (ref 32.2–35.5)
MCV RBC AUTO: 83.3 FL (ref 81.4–97.8)
MICRO URNS: ABNORMAL
MONOCYTES # BLD AUTO: 0.31 K/UL (ref 0–0.85)
MONOCYTES NFR BLD AUTO: 8.9 % (ref 0–13.4)
MUCOUS THREADS URNS QL MICRO: PRESENT /HPF
NEUTROPHILS # BLD AUTO: 1.53 K/UL (ref 1.82–7.42)
NEUTROPHILS NFR BLD: 43.9 % (ref 44–72)
NITRITE UR QL STRIP.AUTO: NEGATIVE
NRBC # BLD AUTO: 0 K/UL
NRBC BLD-RTO: 0 /100 WBC (ref 0–0.2)
PH UR STRIP.AUTO: 5.5 [PH] (ref 5–8)
PLATELET # BLD AUTO: 228 K/UL (ref 164–446)
PMV BLD AUTO: 11.5 FL (ref 9–12.9)
POTASSIUM SERPL-SCNC: 3.7 MMOL/L (ref 3.6–5.5)
PROT SERPL-MCNC: 6.7 G/DL (ref 6–8.2)
PROT UR QL STRIP: NEGATIVE MG/DL
PROT UR-MCNC: 6.7 MG/DL (ref 0–15)
PROT/CREAT UR: 48 MG/G (ref 10–107)
RBC # BLD AUTO: 4.31 M/UL (ref 4.2–5.4)
RBC # URNS HPF: ABNORMAL /HPF (ref 0–2)
RBC UR QL AUTO: NEGATIVE
SODIUM SERPL-SCNC: 141 MMOL/L (ref 135–145)
SP GR UR STRIP.AUTO: 1.01
UROBILINOGEN UR STRIP.AUTO-MCNC: 0.2 EU/DL
WBC # BLD AUTO: 3.5 K/UL (ref 4.8–10.8)
WBC #/AREA URNS HPF: ABNORMAL /HPF

## 2025-07-24 PROCEDURE — 85652 RBC SED RATE AUTOMATED: CPT

## 2025-07-24 PROCEDURE — 81001 URINALYSIS AUTO W/SCOPE: CPT

## 2025-07-24 PROCEDURE — 80053 COMPREHEN METABOLIC PANEL: CPT

## 2025-07-24 PROCEDURE — 85025 COMPLETE CBC W/AUTO DIFF WBC: CPT

## 2025-07-24 PROCEDURE — 86162 COMPLEMENT TOTAL (CH50): CPT

## 2025-07-24 PROCEDURE — 82570 ASSAY OF URINE CREATININE: CPT

## 2025-07-24 PROCEDURE — 86160 COMPLEMENT ANTIGEN: CPT | Mod: 91

## 2025-07-24 PROCEDURE — 86225 DNA ANTIBODY NATIVE: CPT

## 2025-07-24 PROCEDURE — 86140 C-REACTIVE PROTEIN: CPT

## 2025-07-24 PROCEDURE — 36415 COLL VENOUS BLD VENIPUNCTURE: CPT

## 2025-07-24 PROCEDURE — 84156 ASSAY OF PROTEIN URINE: CPT

## 2025-07-26 LAB — CH50 SERPL-ACNC: 63.9 U/ML (ref 38.7–89.9)

## 2025-07-27 LAB — DSDNA AB TITR SER CLIF: NORMAL {TITER}

## 2025-08-04 ENCOUNTER — HOSPITAL ENCOUNTER (OUTPATIENT)
Facility: MEDICAL CENTER | Age: 45
End: 2025-08-04
Attending: STUDENT IN AN ORGANIZED HEALTH CARE EDUCATION/TRAINING PROGRAM
Payer: COMMERCIAL

## 2025-08-04 ENCOUNTER — OFFICE VISIT (OUTPATIENT)
Dept: MEDICAL GROUP | Facility: MEDICAL CENTER | Age: 45
End: 2025-08-04
Payer: COMMERCIAL

## 2025-08-04 VITALS
SYSTOLIC BLOOD PRESSURE: 110 MMHG | TEMPERATURE: 99 F | RESPIRATION RATE: 18 BRPM | HEART RATE: 70 BPM | HEIGHT: 69 IN | BODY MASS INDEX: 23.18 KG/M2 | WEIGHT: 156.5 LBS | DIASTOLIC BLOOD PRESSURE: 72 MMHG | OXYGEN SATURATION: 100 %

## 2025-08-04 DIAGNOSIS — R30.0 DYSURIA: ICD-10-CM

## 2025-08-04 DIAGNOSIS — N30.00 ACUTE CYSTITIS WITHOUT HEMATURIA: ICD-10-CM

## 2025-08-04 DIAGNOSIS — N30.00 ACUTE CYSTITIS WITHOUT HEMATURIA: Primary | ICD-10-CM

## 2025-08-04 LAB
APPEARANCE UR: NORMAL
BILIRUB UR STRIP-MCNC: NORMAL MG/DL
COLOR UR AUTO: YELLOW
GLUCOSE UR STRIP.AUTO-MCNC: NEGATIVE MG/DL
KETONES UR STRIP.AUTO-MCNC: NORMAL MG/DL
LEUKOCYTE ESTERASE UR QL STRIP.AUTO: NORMAL
NITRITE UR QL STRIP.AUTO: NEGATIVE
PH UR STRIP.AUTO: 7 [PH] (ref 5–8)
PROT UR QL STRIP: NORMAL MG/DL
RBC UR QL AUTO: NEGATIVE
SP GR UR STRIP.AUTO: 1.03
UROBILINOGEN UR STRIP-MCNC: NORMAL MG/DL

## 2025-08-04 PROCEDURE — 3074F SYST BP LT 130 MM HG: CPT | Performed by: STUDENT IN AN ORGANIZED HEALTH CARE EDUCATION/TRAINING PROGRAM

## 2025-08-04 PROCEDURE — 81002 URINALYSIS NONAUTO W/O SCOPE: CPT | Performed by: STUDENT IN AN ORGANIZED HEALTH CARE EDUCATION/TRAINING PROGRAM

## 2025-08-04 PROCEDURE — 99213 OFFICE O/P EST LOW 20 MIN: CPT | Performed by: STUDENT IN AN ORGANIZED HEALTH CARE EDUCATION/TRAINING PROGRAM

## 2025-08-04 PROCEDURE — 87086 URINE CULTURE/COLONY COUNT: CPT

## 2025-08-04 PROCEDURE — 3078F DIAST BP <80 MM HG: CPT | Performed by: STUDENT IN AN ORGANIZED HEALTH CARE EDUCATION/TRAINING PROGRAM

## 2025-08-04 RX ORDER — NITROFURANTOIN 25; 75 MG/1; MG/1
100 CAPSULE ORAL 2 TIMES DAILY
Qty: 10 CAPSULE | Refills: 0 | Status: SHIPPED | OUTPATIENT
Start: 2025-08-04 | End: 2025-08-09

## 2025-08-04 ASSESSMENT — FIBROSIS 4 INDEX: FIB4 SCORE: 1.09

## 2025-08-06 LAB
BACTERIA UR CULT: NORMAL
SIGNIFICANT IND 70042: NORMAL
SITE SITE: NORMAL
SOURCE SOURCE: NORMAL

## 2025-08-13 DIAGNOSIS — E78.00 PRIMARY HYPERCHOLESTEROLEMIA: ICD-10-CM

## 2025-08-13 RX ORDER — ATORVASTATIN CALCIUM 10 MG/1
10 TABLET, FILM COATED ORAL EVERY EVENING
Qty: 90 TABLET | Refills: 3 | Status: SHIPPED | OUTPATIENT
Start: 2025-08-13